# Patient Record
Sex: MALE | Race: WHITE | NOT HISPANIC OR LATINO
[De-identification: names, ages, dates, MRNs, and addresses within clinical notes are randomized per-mention and may not be internally consistent; named-entity substitution may affect disease eponyms.]

---

## 2017-03-28 ENCOUNTER — APPOINTMENT (OUTPATIENT)
Dept: CARDIOLOGY | Facility: CLINIC | Age: 82
End: 2017-03-28

## 2017-03-28 VITALS
OXYGEN SATURATION: 99 % | DIASTOLIC BLOOD PRESSURE: 66 MMHG | BODY MASS INDEX: 22.73 KG/M2 | WEIGHT: 150 LBS | SYSTOLIC BLOOD PRESSURE: 141 MMHG | HEART RATE: 58 BPM | HEIGHT: 68 IN

## 2017-03-28 VITALS
OXYGEN SATURATION: 99 % | BODY MASS INDEX: 22.73 KG/M2 | DIASTOLIC BLOOD PRESSURE: 66 MMHG | SYSTOLIC BLOOD PRESSURE: 141 MMHG | HEART RATE: 60 BPM | HEIGHT: 68 IN | RESPIRATION RATE: 12 BRPM | WEIGHT: 150 LBS

## 2017-03-28 DIAGNOSIS — Z95.0 PRESENCE OF CARDIAC PACEMAKER: ICD-10-CM

## 2017-03-28 DIAGNOSIS — Z95.4 PRESENCE OF OTHER HEART-VALVE REPLACEMENT: ICD-10-CM

## 2017-03-28 DIAGNOSIS — I48.91 UNSPECIFIED ATRIAL FIBRILLATION: ICD-10-CM

## 2017-03-28 DIAGNOSIS — I10 ESSENTIAL (PRIMARY) HYPERTENSION: ICD-10-CM

## 2017-03-28 DIAGNOSIS — Z85.528 PERSONAL HISTORY OF OTHER MALIGNANT NEOPLASM OF KIDNEY: ICD-10-CM

## 2017-09-26 ENCOUNTER — APPOINTMENT (OUTPATIENT)
Dept: CARDIOLOGY | Facility: CLINIC | Age: 82
End: 2017-09-26

## 2018-12-26 ENCOUNTER — APPOINTMENT (OUTPATIENT)
Dept: CARDIOLOGY | Facility: CLINIC | Age: 83
End: 2018-12-26
Payer: MEDICARE

## 2018-12-26 PROCEDURE — 93280 PM DEVICE PROGR EVAL DUAL: CPT

## 2019-08-14 ENCOUNTER — APPOINTMENT (OUTPATIENT)
Dept: CARDIOLOGY | Facility: CLINIC | Age: 84
End: 2019-08-14
Payer: MEDICARE

## 2019-08-14 ENCOUNTER — APPOINTMENT (OUTPATIENT)
Dept: CARDIOLOGY | Facility: CLINIC | Age: 84
End: 2019-08-14

## 2019-08-14 PROCEDURE — 93280 PM DEVICE PROGR EVAL DUAL: CPT

## 2020-11-16 ENCOUNTER — APPOINTMENT (OUTPATIENT)
Dept: CARDIOLOGY | Facility: CLINIC | Age: 85
End: 2020-11-16

## 2021-06-15 ENCOUNTER — INPATIENT (INPATIENT)
Facility: HOSPITAL | Age: 86
LOS: 7 days | Discharge: NURSING, REHAB OR LONG-TERM CARE | DRG: 522 | End: 2021-06-23
Attending: FAMILY MEDICINE | Admitting: FAMILY MEDICINE
Payer: MEDICARE

## 2021-06-15 VITALS
TEMPERATURE: 98 F | OXYGEN SATURATION: 95 % | HEIGHT: 66 IN | HEART RATE: 92 BPM | SYSTOLIC BLOOD PRESSURE: 122 MMHG | DIASTOLIC BLOOD PRESSURE: 74 MMHG | RESPIRATION RATE: 18 BRPM | WEIGHT: 169.98 LBS

## 2021-06-15 DIAGNOSIS — S72.001A FRACTURE OF UNSPECIFIED PART OF NECK OF RIGHT FEMUR, INITIAL ENCOUNTER FOR CLOSED FRACTURE: ICD-10-CM

## 2021-06-15 LAB
ALBUMIN SERPL ELPH-MCNC: 3.4 G/DL — SIGNIFICANT CHANGE UP (ref 3.3–5)
ALP SERPL-CCNC: 146 U/L — HIGH (ref 40–120)
ALT FLD-CCNC: 13 U/L — SIGNIFICANT CHANGE UP (ref 12–78)
ANION GAP SERPL CALC-SCNC: 10 MMOL/L — SIGNIFICANT CHANGE UP (ref 5–17)
APTT BLD: 32.3 SEC — SIGNIFICANT CHANGE UP (ref 27.5–35.5)
AST SERPL-CCNC: 21 U/L — SIGNIFICANT CHANGE UP (ref 15–37)
BASOPHILS # BLD AUTO: 0.01 K/UL — SIGNIFICANT CHANGE UP (ref 0–0.2)
BASOPHILS NFR BLD AUTO: 0.1 % — SIGNIFICANT CHANGE UP (ref 0–2)
BILIRUB SERPL-MCNC: 0.5 MG/DL — SIGNIFICANT CHANGE UP (ref 0.2–1.2)
BUN SERPL-MCNC: 35 MG/DL — HIGH (ref 7–23)
CALCIUM SERPL-MCNC: 9 MG/DL — SIGNIFICANT CHANGE UP (ref 8.5–10.1)
CHLORIDE SERPL-SCNC: 104 MMOL/L — SIGNIFICANT CHANGE UP (ref 96–108)
CO2 SERPL-SCNC: 25 MMOL/L — SIGNIFICANT CHANGE UP (ref 22–31)
CREAT SERPL-MCNC: 2 MG/DL — HIGH (ref 0.5–1.3)
EOSINOPHIL # BLD AUTO: 0.02 K/UL — SIGNIFICANT CHANGE UP (ref 0–0.5)
EOSINOPHIL NFR BLD AUTO: 0.2 % — SIGNIFICANT CHANGE UP (ref 0–6)
GLUCOSE SERPL-MCNC: 121 MG/DL — HIGH (ref 70–99)
HCT VFR BLD CALC: 35.9 % — LOW (ref 39–50)
HGB BLD-MCNC: 11.9 G/DL — LOW (ref 13–17)
IMM GRANULOCYTES NFR BLD AUTO: 0.2 % — SIGNIFICANT CHANGE UP (ref 0–1.5)
INR BLD: 1.11 RATIO — SIGNIFICANT CHANGE UP (ref 0.88–1.16)
LYMPHOCYTES # BLD AUTO: 0.96 K/UL — LOW (ref 1–3.3)
LYMPHOCYTES # BLD AUTO: 11.5 % — LOW (ref 13–44)
MCHC RBC-ENTMCNC: 30.1 PG — SIGNIFICANT CHANGE UP (ref 27–34)
MCHC RBC-ENTMCNC: 33.1 GM/DL — SIGNIFICANT CHANGE UP (ref 32–36)
MCV RBC AUTO: 90.9 FL — SIGNIFICANT CHANGE UP (ref 80–100)
MONOCYTES # BLD AUTO: 0.87 K/UL — SIGNIFICANT CHANGE UP (ref 0–0.9)
MONOCYTES NFR BLD AUTO: 10.5 % — SIGNIFICANT CHANGE UP (ref 2–14)
NEUTROPHILS # BLD AUTO: 6.44 K/UL — SIGNIFICANT CHANGE UP (ref 1.8–7.4)
NEUTROPHILS NFR BLD AUTO: 77.5 % — HIGH (ref 43–77)
NRBC # BLD: 0 /100 WBCS — SIGNIFICANT CHANGE UP (ref 0–0)
PLATELET # BLD AUTO: 171 K/UL — SIGNIFICANT CHANGE UP (ref 150–400)
POTASSIUM SERPL-MCNC: 3.9 MMOL/L — SIGNIFICANT CHANGE UP (ref 3.5–5.3)
POTASSIUM SERPL-SCNC: 3.9 MMOL/L — SIGNIFICANT CHANGE UP (ref 3.5–5.3)
PROT SERPL-MCNC: 7.3 G/DL — SIGNIFICANT CHANGE UP (ref 6–8.3)
PROTHROM AB SERPL-ACNC: 12.9 SEC — SIGNIFICANT CHANGE UP (ref 10.6–13.6)
RBC # BLD: 3.95 M/UL — LOW (ref 4.2–5.8)
RBC # FLD: 13.5 % — SIGNIFICANT CHANGE UP (ref 10.3–14.5)
SODIUM SERPL-SCNC: 139 MMOL/L — SIGNIFICANT CHANGE UP (ref 135–145)
TSH SERPL-MCNC: 0.8 UIU/ML — SIGNIFICANT CHANGE UP (ref 0.36–3.74)
WBC # BLD: 8.32 K/UL — SIGNIFICANT CHANGE UP (ref 3.8–10.5)
WBC # FLD AUTO: 8.32 K/UL — SIGNIFICANT CHANGE UP (ref 3.8–10.5)

## 2021-06-15 PROCEDURE — 73552 X-RAY EXAM OF FEMUR 2/>: CPT | Mod: 26,RT

## 2021-06-15 PROCEDURE — 72170 X-RAY EXAM OF PELVIS: CPT | Mod: 26,59

## 2021-06-15 PROCEDURE — G1004: CPT

## 2021-06-15 PROCEDURE — 76376 3D RENDER W/INTRP POSTPROCES: CPT | Mod: 26

## 2021-06-15 PROCEDURE — 99285 EMERGENCY DEPT VISIT HI MDM: CPT

## 2021-06-15 PROCEDURE — 71045 X-RAY EXAM CHEST 1 VIEW: CPT | Mod: 26

## 2021-06-15 PROCEDURE — 73502 X-RAY EXAM HIP UNI 2-3 VIEWS: CPT | Mod: 26,RT

## 2021-06-15 PROCEDURE — 70450 CT HEAD/BRAIN W/O DYE: CPT | Mod: 26,ME

## 2021-06-15 PROCEDURE — 72192 CT PELVIS W/O DYE: CPT | Mod: 26

## 2021-06-15 RX ORDER — FINASTERIDE 5 MG/1
5 TABLET, FILM COATED ORAL DAILY
Refills: 0 | Status: DISCONTINUED | OUTPATIENT
Start: 2021-06-15 | End: 2021-06-17

## 2021-06-15 RX ORDER — ACETAMINOPHEN 500 MG
650 TABLET ORAL EVERY 6 HOURS
Refills: 0 | Status: DISCONTINUED | OUTPATIENT
Start: 2021-06-15 | End: 2021-06-17

## 2021-06-15 RX ORDER — ASCORBIC ACID 60 MG
500 TABLET,CHEWABLE ORAL DAILY
Refills: 0 | Status: DISCONTINUED | OUTPATIENT
Start: 2021-06-15 | End: 2021-06-17

## 2021-06-15 RX ORDER — MECLIZINE HCL 12.5 MG
12.5 TABLET ORAL THREE TIMES A DAY
Refills: 0 | Status: DISCONTINUED | OUTPATIENT
Start: 2021-06-15 | End: 2021-06-17

## 2021-06-15 RX ORDER — FOLIC ACID 0.8 MG
1 TABLET ORAL DAILY
Refills: 0 | Status: DISCONTINUED | OUTPATIENT
Start: 2021-06-15 | End: 2021-06-17

## 2021-06-15 RX ORDER — ATENOLOL 25 MG/1
25 TABLET ORAL DAILY
Refills: 0 | Status: DISCONTINUED | OUTPATIENT
Start: 2021-06-15 | End: 2021-06-16

## 2021-06-15 RX ORDER — SIMVASTATIN 20 MG/1
10 TABLET, FILM COATED ORAL AT BEDTIME
Refills: 0 | Status: DISCONTINUED | OUTPATIENT
Start: 2021-06-15 | End: 2021-06-17

## 2021-06-15 RX ORDER — SODIUM CHLORIDE 9 MG/ML
1000 INJECTION INTRAMUSCULAR; INTRAVENOUS; SUBCUTANEOUS ONCE
Refills: 0 | Status: COMPLETED | OUTPATIENT
Start: 2021-06-15 | End: 2021-06-15

## 2021-06-15 RX ORDER — SODIUM CHLORIDE 9 MG/ML
1000 INJECTION, SOLUTION INTRAVENOUS
Refills: 0 | Status: DISCONTINUED | OUTPATIENT
Start: 2021-06-15 | End: 2021-06-17

## 2021-06-15 RX ORDER — HEPARIN SODIUM 5000 [USP'U]/ML
5000 INJECTION INTRAVENOUS; SUBCUTANEOUS EVERY 12 HOURS
Refills: 0 | Status: DISCONTINUED | OUTPATIENT
Start: 2021-06-15 | End: 2021-06-15

## 2021-06-15 RX ADMIN — SODIUM CHLORIDE 1000 MILLILITER(S): 9 INJECTION INTRAMUSCULAR; INTRAVENOUS; SUBCUTANEOUS at 19:09

## 2021-06-15 RX ADMIN — FINASTERIDE 5 MILLIGRAM(S): 5 TABLET, FILM COATED ORAL at 22:43

## 2021-06-15 RX ADMIN — SIMVASTATIN 10 MILLIGRAM(S): 20 TABLET, FILM COATED ORAL at 22:43

## 2021-06-15 RX ADMIN — ATENOLOL 25 MILLIGRAM(S): 25 TABLET ORAL at 22:43

## 2021-06-15 RX ADMIN — Medication 500 MILLIGRAM(S): at 22:43

## 2021-06-15 RX ADMIN — Medication 1 MILLIGRAM(S): at 22:43

## 2021-06-15 NOTE — H&P ADULT - NSICDXPASTMEDICALHX_GEN_ALL_CORE_FT
PAST MEDICAL HISTORY:  Aortic prosthetic valve regurgitation     Aortic valve disorder     Artificial pacemaker     Benign essential hypertension     Calculus of ureter 2/2015    h/o  ? Atrial fibrillation     h/o Aortic aneurysm     h/o Disc prolapse Lumbar     h/o Symptomatic Bradycardia     h/o TIA (transient ischemic attack) 6 yrs ago     H/O unilateral nephrectomy     HLD (hyperlipidemia)     HTN (hypertension)     Hyperlipidemia

## 2021-06-15 NOTE — H&P ADULT - ASSESSMENT
FAM GRANADOS is a 89y Male presented to the ED from home with son for generalized weakness and worsening confusion and a fall on Monday morning around 3 am. At baseline patient ambulates with walker.

## 2021-06-15 NOTE — ED PROVIDER NOTE - PMH
Aortic prosthetic valve regurgitation    Aortic valve disorder    Artificial pacemaker    Benign essential hypertension    Calculus of ureter  2/2015  h/o  ? Atrial fibrillation    h/o Aortic aneurysm    h/o Disc prolapse Lumbar    h/o Symptomatic Bradycardia    h/o TIA (transient ischemic attack) 6 yrs ago    H/O unilateral nephrectomy    HLD (hyperlipidemia)    HTN (hypertension)    Hyperlipidemia

## 2021-06-15 NOTE — ED PROVIDER NOTE - ATTENDING CONTRIBUTION TO CARE
Pt is a 89 yo male who presents to the ED with a cc of worsening confusion and weakness since Monday. PMHx of Aortic prosthetic valve regurgitation, Artificial pacemaker, HTN, h/o renal stone, ? h/o A-fib not on AC, h/o aortic aneurysm, h/o lumbar disc herniation, h/o bradycardia, h/o TIA, h/o nephrectomy, HLD, HTN.  Pt reports that on Monday around 3 am he suffered a fall. Per son he reports that they have cameras set up in the house and they were able to witness him tripping and falling early Monday. At baseline pt usually ambulates with a walker. Since then pt appears to be more confused then normal and has not been able to able. Son reports that they attempted to stand him and he collapsed to the ground. It appeared that his legs just gave out on him. Son reports no episodes of vomiting or diarrhea. Pt cannot provide history due to advanced dementia. On exam pt lying in bed awake and appears to be alert to name only at this time, NCAT, PERRL, heart RR with murmur, pacemaker noted, lungs CTA, abd soft NT/ND. No midline C/T/L noted on exam. Diffuse TTP to right hip noted on exam. Pt is moving RLE. Difficult to obtain good leg length exam as pt is lying on left side and has right leg flexed at knee. Attempts to straighten are actively fought by pt. +pedal pulse bilaterally. Pt presenting with fall and now with worsening confusion and inability to ambulate. Concern for infection vs possible fracture. Will obtain screening labs x-ray of right hip, CT head EKG and will monitor

## 2021-06-15 NOTE — ED PROVIDER NOTE - PSH
Aortic valve replacement Oct 2011    h/o Lumbar laminectomy    Pacemaker  Oct 2011  Patent foramen ovale repair    s/p Aortic aneurysm repair  10/25/2011

## 2021-06-15 NOTE — ED ADULT NURSE NOTE - NSIMPLEMENTINTERV_GEN_ALL_ED
Implemented All Fall with Harm Risk Interventions:  [place] to call system. Call bell, personal items and telephone within reach. Instruct patient to call for assistance. Room bathroom lighting operational. Non-slip footwear when patient is off stretcher. Physically safe environment: no spills, clutter or unnecessary equipment. Stretcher in lowest position, wheels locked, appropriate side rails in place. Provide visual cue, wrist band, yellow gown, etc. Monitor gait and stability. Monitor for mental status changes and reorient to person, place, and time. Review medications for side effects contributing to fall risk. Reinforce activity limits and safety measures with patient and family. Provide visual clues: red socks.

## 2021-06-15 NOTE — H&P ADULT - HISTORY OF PRESENT ILLNESS
Chart and labs reviewed.   Chart and labs reviewed.  FAM GRANADOS is a 89y Male presented to the ED from home with son for generalized weakness and worsening confusion and a fall on Monday morning around 3 am. At baseline patient ambulates with walker. Son explains they have cameras in the house and saw him trip and fall Monday morning. Since then patient has been more confused than baseline and unable ambulate. Patient unable to provide history due to dementia. As per son, no sob, vomiting or diarrhea. no blood thinners. Patient currently takes no medications daily.

## 2021-06-15 NOTE — ED ADULT NURSE NOTE - OBJECTIVE STATEMENT
Pt presents to the ED via ambulance s/p increase confusion after a fall at Monday 330 am. Pt has a history of dementia as per son. Pt is moving all of his extremities independently.

## 2021-06-15 NOTE — ED PROVIDER NOTE - SKIN, MLM
Skin normal color for race, warm, dry and intact. No evidence of rash. no signs of trauma. no abrasions, lacerations or ecchymosis

## 2021-06-15 NOTE — ED PROVIDER NOTE - OBJECTIVE STATEMENT
89 yo male with h/o pacemaker and dementia presents to the ED from home with son for generalized weakness and worsening confusion s/p fall Monday morning around 3 am. At baseline patient ambulates with walker. Son explains they have cameras in the house and saw him trip and fall Monday morning. Since then patient has been more confused than baseline and unable ambulate. Patient unable to provide history due to dementia. As per son, no sob, vomiting or diarrhea. no blood thinners. Patient currently takes no medications daily.     pmd: Dr. Frank Amico

## 2021-06-15 NOTE — H&P ADULT - NSHPLABSRESULTS_GEN_ALL_CORE
11.9   8.32  )-----------( 171      ( 15 Nigel 2021 19:53 )             35.9     15 Nigel 2021 19:53    139    |  104    |  35     ----------------------------<  121    3.9     |  25     |  2.00     Ca    9.0        15 Nigel 2021 19:53    TPro  7.3    /  Alb  3.4    /  TBili  0.5    /  DBili  x      /  AST  21     /  ALT  13     /  AlkPhos  146    15 Nigel 2021 19:53    LIVER FUNCTIONS - ( 15 Nigel 2021 19:53 )  Alb: 3.4 g/dL / Pro: 7.3 g/dL / ALK PHOS: 146 U/L / ALT: 13 U/L / AST: 21 U/L / GGT: x             CAPILLARY BLOOD GLUCOSE    < from: CT Head No Cont (06.15.21 @ 19:05) >    IMPRESSION: No evidence acute hemorrhage mass or mass effect.    < end of copied text > 11.9   8.32  )-----------( 171      ( 15 Nigel 2021 19:53 )             35.9     15 Nigel 2021 19:53    139    |  104    |  35     ----------------------------<  121    3.9     |  25     |  2.00     Ca    9.0        15 Nigel 2021 19:53    TPro  7.3    /  Alb  3.4    /  TBili  0.5    /  DBili  x      /  AST  21     /  ALT  13     /  AlkPhos  146    15 Nigel 2021 19:53    LIVER FUNCTIONS - ( 15 Nigel 2021 19:53 )  Alb: 3.4 g/dL / Pro: 7.3 g/dL / ALK PHOS: 146 U/L / ALT: 13 U/L / AST: 21 U/L / GGT: x             CAPILLARY BLOOD GLUCOSE    < from: CT Head No Cont (06.15.21 @ 19:05) >    IMPRESSION: No evidence acute hemorrhage mass or mass effect.    < end of copied text >    x< from: Xray Femur 2 Views, Right (06.15.21 @ 21:21) >    MPRESSION: Subcapital fracture right hip described in a separate report. Remainder the rightfemur is intact. Diffuse demineralization. Degenerative change at the knee. Vascular calcification.    < end of copied text >    < from: Xray Hip 2-3 Views, Left (06.16.21 @ 10:30) >        < end of copied text >

## 2021-06-15 NOTE — H&P ADULT - RS GEN PE MLT RESP DETAILS PC
airway patent/breath sounds equal/respirations non-labored/clear to auscultation bilaterally/diminished breath sounds, L/diminished breath sounds, R

## 2021-06-15 NOTE — ED ADULT TRIAGE NOTE - CHIEF COMPLAINT QUOTE
patient cam ein ED from home BIBA with c/o fall incident last Monday 03:00am, and worsening confusion since then.

## 2021-06-15 NOTE — ED PROVIDER NOTE - MUSCULOSKELETAL, MLM
Spine appears normal, range of motion is not limited, no muscle or joint tenderness. no chest wall tenderness. no midline vertebral tenderness. no pelvic tenderness, FROM of hips.

## 2021-06-15 NOTE — H&P ADULT - MUSCULOSKELETAL
ROM intact/no joint swelling/no joint erythema/no joint warmth/no calf tenderness details… detailed exam no joint swelling/no joint erythema/no joint warmth/no calf tenderness

## 2021-06-15 NOTE — ED PROVIDER NOTE - DATE/TIME FOR CONVERSATION WITH ATTENDING MD
Medical Necessity Information: It is in your best interest to select a reason for this procedure from the list below. All of these items fulfill various CMS LCD requirements except the new and changing color options. Consent: The patient's consent was obtained including but not limited to risks of crusting, scabbing, blistering, scarring, darker or lighter pigmentary change, recurrence, incomplete removal and infection. Render Post-Care Instructions In Note?: no Number Of Freeze-Thaw Cycles: 3 freeze-thaw cycles Detail Level: Detailed Post-Care Instructions: I reviewed with the patient in detail post-care instructions. Patient is to wear sunprotection, and avoid picking at any of the treated lesions. Pt may apply Vaseline to crusted or scabbing areas. Medical Necessity Clause: This procedure was medically necessary because the lesions that were treated were: 15-Nigel-2021 20:46

## 2021-06-15 NOTE — CONSULT NOTE ADULT - SUBJECTIVE AND OBJECTIVE BOX
89y Male presents with right hip pain. Patient has a hx of dementia and lives at home with a health aide. He was noted to have a fall on monday morning, and was caught on camera. Due to patient dementia patient unable to provide histoyr, Son was at bedside. Unknown headstrike, or LOC. Patient denies any numbness/tingling in the affected extremity. The patient normally ambulates with a walker at baseline. They deny any other orthopedic injuries at this time.       PAST MEDICAL & SURGICAL HISTORY:  HTN (hypertension)    HLD (hyperlipidemia)    h/o Aortic aneurysm    h/o TIA (transient ischemic attack) 6 yrs ago    h/o Disc prolapse Lumbar    h/o Symptomatic Bradycardia    h/o  ? Atrial fibrillation    Hyperlipidemia    Benign essential hypertension    Aortic valve disorder    H/O unilateral nephrectomy    Aortic prosthetic valve regurgitation    Artificial pacemaker    Calculus of ureter  2/2015    h/o Lumbar laminectomy    Pacemaker  Oct 2011    s/p Aortic aneurysm repair  10/25/2011    Aortic valve replacement Oct 2011    Patent foramen ovale repair      Home Medications:  acetaminophen 325 mg oral tablet: 2 tab(s) orally every 6 hours, As needed, For Temp over 38.3 C (100.94 F) (17 Feb 2015 09:59)  alfuzosin 10 mg oral tablet, extended release: 1 tab(s) orally once a day (17 Feb 2015 09:59)  atenolol 25 mg oral tablet: 1 tab(s) orally once a day (17 Feb 2015 09:59)  Duricef: 500mg 1 tab PO q24h (17 Feb 2015 14:33)  Ecotrin 325 mg oral delayed release tablet: 1 tab(s) orally once a day (17 Feb 2015 09:59)  finasteride 5 mg oral tablet: 1 tab(s) orally once a day (17 Feb 2015 09:59)  folic acid 1 mg oral tablet: 1 tab(s) orally once a day (17 Feb 2015 09:59)  meclizine 12.5 mg oral tablet: 1 tab(s) orally 3 times a day (17 Feb 2015 09:59)  simvastatin 10 mg oral tablet: 1 tab(s) orally once a day (at bedtime) (17 Feb 2015 09:59)  tamsulosin 0.4 mg oral capsule: 1 cap(s) orally once a day (17 Feb 2015 09:59)  Tylenol with Codeine #2: 1 tab PO q6h PRN pain  (17 Feb 2015 14:33)  Vitamin C 500 mg oral tablet: 1 tab(s) orally once a day (17 Feb 2015 09:59)    Allergies    shellfish (Rash)  Sulfa druga - Rash (Rash)  sulfa drugs (Hives)    Intolerances                              11.9   8.32  )-----------( 171      ( 15 Nigel 2021 19:53 )             35.9     06-15    139  |  104  |  35<H>  ----------------------------<  121<H>  3.9   |  25  |  2.00<H>    Ca    9.0      15 Nigel 2021 19:53    TPro  7.3  /  Alb  3.4  /  TBili  0.5  /  DBili  x   /  AST  21  /  ALT  13  /  AlkPhos  146<H>  06-15          Vital Signs Last 24 Hrs  T(C): 36.8 (15 Nigel 2021 18:05), Max: 36.8 (15 Nigel 2021 18:05)  T(F): 98.2 (15 Nigel 2021 18:05), Max: 98.2 (15 Nigel 2021 18:05)  HR: 92 (15 Nigel 2021 17:14) (92 - 92)  BP: 122/74 (15 Nigel 2021 17:14) (122/74 - 122/74)  BP(mean): --  RR: 18 (15 Nigel 2021 17:14) (18 - 18)  SpO2: 95% (15 Nigel 2021 17:14) (95% - 95%)    PHYSICAL EXAM  GEN: NAD, Awake and Alert, AOx2  RLE:   Skin: intact without abrasions / ecchymosis  TTP over hip  Unable to SLR  Pain with log roll  Compartments soft and compressible  + EHL/FHL/TA/GSC  SILT L3-S1  DP +    Secondary Exam:  SPINE: Skin intact, no bony tenderness or step-offs appreciated throughout cervical/thoracic/lumbar/sacral spine    BL/UE: Skin intact, no erythema, ecchymosis, edema, gross deformity, NTTP over the bony prominences of the shoulder/elbow/wrist/hand, painless passive/active ROM of the shoulder/elbow/wrist/hand, C5-T1 SILT, motor grossly intact throughout axillary/musculocutaenous/radial/median/ulnar nerves, + radial pulse    LLE: Skin intact, no erythema, ecchymosis, edema, gross deformity, NTTP over the bony prominences of the hip/knee/ankle/foot, painless passive/active ROM of the hip/knee/ankle/foot, L2-S1 SILT, motor grossly intact throughout hip flexors/quads/hams/TA/EHL/FHL/GSC, + DP/PT pulses, no pain with log roll, no pain on axial loading, compartments soft and compressible, calves nontender     IMAGING:  XR Right hip: acute displaced femoral neck fracture      Assessment/Plan:  89y Male with right femoral neck fracture    - plan for OR for operative fixation, will need a hemiarthroplasty   - NPO except medications after midnight  - IVF while NPO  - NWB RLE, bedrest  - Please hold chemical DVT ppx, SCDs okay  - FU Preop labs  - FU XR of left hip  - Medical comanagement appreciate by primary medical team  - Patient has a pacemaker, Trippy Bandz, will needed it interrogated, please document clearance   - Patient will likely need cardiology clearance, please document clearance  - Will discuss with attending and advise further if plan changes

## 2021-06-15 NOTE — ED PROVIDER NOTE - CLINICAL SUMMARY MEDICAL DECISION MAKING FREE TEXT BOX
89 yo male with h/o pacemaker and dementia presents to the ED from home with son for generalized weakness and worsening confusion s/p fall Monday morning around 3 am. At baseline patient ambulates with walker. Son explains they have cameras in the house and saw him trip and fall Monday morning. Since then patient has been more confused than baseline and unable ambulate. Patient unable to provide history due to dementia. As per son, no sob, vomiting or diarrhea. no blood thinners. Patient currently takes no medications daily. PE: as above. A/P: ekg, labs, ua/cx, CT head

## 2021-06-15 NOTE — H&P ADULT - NSICDXPASTSURGICALHX_GEN_ALL_CORE_FT
PAST SURGICAL HISTORY:  Aortic valve replacement Oct 2011     h/o Lumbar laminectomy     Pacemaker Oct 2011    Patent foramen ovale repair     s/p Aortic aneurysm repair  10/25/2011

## 2021-06-16 ENCOUNTER — TRANSCRIPTION ENCOUNTER (OUTPATIENT)
Age: 86
End: 2021-06-16

## 2021-06-16 DIAGNOSIS — R41.0 DISORIENTATION, UNSPECIFIED: ICD-10-CM

## 2021-06-16 DIAGNOSIS — S72.001A FRACTURE OF UNSPECIFIED PART OF NECK OF RIGHT FEMUR, INITIAL ENCOUNTER FOR CLOSED FRACTURE: ICD-10-CM

## 2021-06-16 DIAGNOSIS — R41.82 ALTERED MENTAL STATUS, UNSPECIFIED: ICD-10-CM

## 2021-06-16 LAB
A1C WITH ESTIMATED AVERAGE GLUCOSE RESULT: 5.6 % — SIGNIFICANT CHANGE UP (ref 4–5.6)
ANION GAP SERPL CALC-SCNC: 8 MMOL/L — SIGNIFICANT CHANGE UP (ref 5–17)
APPEARANCE UR: CLEAR — SIGNIFICANT CHANGE UP
APTT BLD: 28.9 SEC — SIGNIFICANT CHANGE UP (ref 27.5–35.5)
BILIRUB UR-MCNC: NEGATIVE — SIGNIFICANT CHANGE UP
BUN SERPL-MCNC: 33 MG/DL — HIGH (ref 7–23)
CALCIUM SERPL-MCNC: 8.6 MG/DL — SIGNIFICANT CHANGE UP (ref 8.5–10.1)
CHLORIDE SERPL-SCNC: 110 MMOL/L — HIGH (ref 96–108)
CO2 SERPL-SCNC: 23 MMOL/L — SIGNIFICANT CHANGE UP (ref 22–31)
COLOR SPEC: YELLOW — SIGNIFICANT CHANGE UP
COVID-19 SPIKE DOMAIN AB INTERP: NEGATIVE — SIGNIFICANT CHANGE UP
COVID-19 SPIKE DOMAIN ANTIBODY RESULT: 0.4 U/ML — SIGNIFICANT CHANGE UP
CREAT SERPL-MCNC: 1.5 MG/DL — HIGH (ref 0.5–1.3)
DIFF PNL FLD: ABNORMAL
ESTIMATED AVERAGE GLUCOSE: 114 MG/DL — SIGNIFICANT CHANGE UP (ref 68–114)
GLUCOSE SERPL-MCNC: 105 MG/DL — HIGH (ref 70–99)
GLUCOSE UR QL: NEGATIVE — SIGNIFICANT CHANGE UP
HCT VFR BLD CALC: 32.7 % — LOW (ref 39–50)
HGB BLD-MCNC: 11.1 G/DL — LOW (ref 13–17)
INR BLD: 1.16 RATIO — SIGNIFICANT CHANGE UP (ref 0.88–1.16)
KETONES UR-MCNC: NEGATIVE — SIGNIFICANT CHANGE UP
LEUKOCYTE ESTERASE UR-ACNC: ABNORMAL
MCHC RBC-ENTMCNC: 30.4 PG — SIGNIFICANT CHANGE UP (ref 27–34)
MCHC RBC-ENTMCNC: 33.9 GM/DL — SIGNIFICANT CHANGE UP (ref 32–36)
MCV RBC AUTO: 89.6 FL — SIGNIFICANT CHANGE UP (ref 80–100)
NITRITE UR-MCNC: NEGATIVE — SIGNIFICANT CHANGE UP
NRBC # BLD: 0 /100 WBCS — SIGNIFICANT CHANGE UP (ref 0–0)
PH UR: 6.5 — SIGNIFICANT CHANGE UP (ref 5–8)
PLATELET # BLD AUTO: 167 K/UL — SIGNIFICANT CHANGE UP (ref 150–400)
POTASSIUM SERPL-MCNC: 4.1 MMOL/L — SIGNIFICANT CHANGE UP (ref 3.5–5.3)
POTASSIUM SERPL-SCNC: 4.1 MMOL/L — SIGNIFICANT CHANGE UP (ref 3.5–5.3)
PROT UR-MCNC: 30 MG/DL
PROTHROM AB SERPL-ACNC: 13.5 SEC — SIGNIFICANT CHANGE UP (ref 10.6–13.6)
RBC # BLD: 3.65 M/UL — LOW (ref 4.2–5.8)
RBC # FLD: 13.5 % — SIGNIFICANT CHANGE UP (ref 10.3–14.5)
SARS-COV-2 IGG+IGM SERPL QL IA: 0.4 U/ML — SIGNIFICANT CHANGE UP
SARS-COV-2 IGG+IGM SERPL QL IA: NEGATIVE — SIGNIFICANT CHANGE UP
SARS-COV-2 RNA SPEC QL NAA+PROBE: SIGNIFICANT CHANGE UP
SODIUM SERPL-SCNC: 141 MMOL/L — SIGNIFICANT CHANGE UP (ref 135–145)
SP GR SPEC: 1.01 — SIGNIFICANT CHANGE UP (ref 1.01–1.02)
UROBILINOGEN FLD QL: NEGATIVE — SIGNIFICANT CHANGE UP
WBC # BLD: 9.18 K/UL — SIGNIFICANT CHANGE UP (ref 3.8–10.5)
WBC # FLD AUTO: 9.18 K/UL — SIGNIFICANT CHANGE UP (ref 3.8–10.5)

## 2021-06-16 PROCEDURE — 73502 X-RAY EXAM HIP UNI 2-3 VIEWS: CPT | Mod: 26,LT

## 2021-06-16 PROCEDURE — 99222 1ST HOSP IP/OBS MODERATE 55: CPT | Mod: 25

## 2021-06-16 PROCEDURE — 93280 PM DEVICE PROGR EVAL DUAL: CPT | Mod: 26

## 2021-06-16 RX ORDER — TRAMADOL HYDROCHLORIDE 50 MG/1
50 TABLET ORAL EVERY 6 HOURS
Refills: 0 | Status: DISCONTINUED | OUTPATIENT
Start: 2021-06-17 | End: 2021-06-23

## 2021-06-16 RX ORDER — ASCORBIC ACID 60 MG
500 TABLET,CHEWABLE ORAL DAILY
Refills: 0 | Status: DISCONTINUED | OUTPATIENT
Start: 2021-06-17 | End: 2021-06-23

## 2021-06-16 RX ORDER — POLYETHYLENE GLYCOL 3350 17 G/17G
17 POWDER, FOR SOLUTION ORAL AT BEDTIME
Refills: 0 | Status: DISCONTINUED | OUTPATIENT
Start: 2021-06-17 | End: 2021-06-23

## 2021-06-16 RX ORDER — MECLIZINE HCL 12.5 MG
12.5 TABLET ORAL THREE TIMES A DAY
Refills: 0 | Status: DISCONTINUED | OUTPATIENT
Start: 2021-06-17 | End: 2021-06-23

## 2021-06-16 RX ORDER — SENNA PLUS 8.6 MG/1
2 TABLET ORAL AT BEDTIME
Refills: 0 | Status: DISCONTINUED | OUTPATIENT
Start: 2021-06-17 | End: 2021-06-23

## 2021-06-16 RX ORDER — MORPHINE SULFATE 50 MG/1
2 CAPSULE, EXTENDED RELEASE ORAL EVERY 4 HOURS
Refills: 0 | Status: DISCONTINUED | OUTPATIENT
Start: 2021-06-17 | End: 2021-06-17

## 2021-06-16 RX ORDER — FOLIC ACID 0.8 MG
1 TABLET ORAL DAILY
Refills: 0 | Status: DISCONTINUED | OUTPATIENT
Start: 2021-06-17 | End: 2021-06-23

## 2021-06-16 RX ORDER — MAGNESIUM HYDROXIDE 400 MG/1
30 TABLET, CHEWABLE ORAL DAILY
Refills: 0 | Status: DISCONTINUED | OUTPATIENT
Start: 2021-06-17 | End: 2021-06-23

## 2021-06-16 RX ORDER — ACETAMINOPHEN 500 MG
650 TABLET ORAL EVERY 6 HOURS
Refills: 0 | Status: DISCONTINUED | OUTPATIENT
Start: 2021-06-17 | End: 2021-06-23

## 2021-06-16 RX ORDER — FINASTERIDE 5 MG/1
5 TABLET, FILM COATED ORAL DAILY
Refills: 0 | Status: DISCONTINUED | OUTPATIENT
Start: 2021-06-17 | End: 2021-06-23

## 2021-06-16 RX ORDER — ONDANSETRON 8 MG/1
4 TABLET, FILM COATED ORAL EVERY 6 HOURS
Refills: 0 | Status: DISCONTINUED | OUTPATIENT
Start: 2021-06-17 | End: 2021-06-23

## 2021-06-16 RX ORDER — HEPARIN SODIUM 5000 [USP'U]/ML
5000 INJECTION INTRAVENOUS; SUBCUTANEOUS ONCE
Refills: 0 | Status: COMPLETED | OUTPATIENT
Start: 2021-06-16 | End: 2021-06-16

## 2021-06-16 RX ORDER — ACETAMINOPHEN 500 MG
1000 TABLET ORAL ONCE
Refills: 0 | Status: COMPLETED | OUTPATIENT
Start: 2021-06-16 | End: 2021-06-16

## 2021-06-16 RX ORDER — MORPHINE SULFATE 50 MG/1
2 CAPSULE, EXTENDED RELEASE ORAL EVERY 4 HOURS
Refills: 0 | Status: DISCONTINUED | OUTPATIENT
Start: 2021-06-16 | End: 2021-06-17

## 2021-06-16 RX ORDER — SIMVASTATIN 20 MG/1
10 TABLET, FILM COATED ORAL AT BEDTIME
Refills: 0 | Status: DISCONTINUED | OUTPATIENT
Start: 2021-06-17 | End: 2021-06-23

## 2021-06-16 RX ORDER — PANTOPRAZOLE SODIUM 20 MG/1
40 TABLET, DELAYED RELEASE ORAL
Refills: 0 | Status: DISCONTINUED | OUTPATIENT
Start: 2021-06-17 | End: 2021-06-23

## 2021-06-16 RX ADMIN — Medication 12.5 MILLIGRAM(S): at 22:05

## 2021-06-16 RX ADMIN — Medication 650 MILLIGRAM(S): at 22:05

## 2021-06-16 RX ADMIN — MORPHINE SULFATE 2 MILLIGRAM(S): 50 CAPSULE, EXTENDED RELEASE ORAL at 23:28

## 2021-06-16 RX ADMIN — HEPARIN SODIUM 5000 UNIT(S): 5000 INJECTION INTRAVENOUS; SUBCUTANEOUS at 22:05

## 2021-06-16 RX ADMIN — SODIUM CHLORIDE 75 MILLILITER(S): 9 INJECTION, SOLUTION INTRAVENOUS at 03:03

## 2021-06-16 RX ADMIN — Medication 1000 MILLIGRAM(S): at 03:03

## 2021-06-16 RX ADMIN — MORPHINE SULFATE 2 MILLIGRAM(S): 50 CAPSULE, EXTENDED RELEASE ORAL at 22:58

## 2021-06-16 RX ADMIN — SIMVASTATIN 10 MILLIGRAM(S): 20 TABLET, FILM COATED ORAL at 22:05

## 2021-06-16 RX ADMIN — Medication 650 MILLIGRAM(S): at 22:35

## 2021-06-16 RX ADMIN — Medication 400 MILLIGRAM(S): at 01:17

## 2021-06-16 NOTE — CONSULT NOTE ADULT - ATTENDING COMMENTS
I personally saw and examined the patient in detail.  I have spoken to the above provider regarding the assessment and plan of care.  I reviewed the above assessment and plan of care, and agree.  I have made changes in the body of the note where appropriate.  Optimized for the OR from a cardiac point of view with no evidence of active ischemic heart disease, decompensated heart failure, severe obstructive valvular disease, or uncontrolled arrhythmia.  Called to have PPM checked.  Will follow up.  D/C atenolol as he is not taking it at home.

## 2021-06-16 NOTE — DISCHARGE NOTE PROVIDER - NSDCFUADDINST_GEN_ALL_CORE_FT
Discharge Instructions  Hip Hemiarthroplasty    1. ACTIVITY: Weight Bearing as Tolerated with assistance and rolling walker. Abduction pillow while in bed or chair.  2. DVT:Continue DVT/PE Prophylaxis. See Med Rec for Duration and dose.  3. PT: daily, Posterior Hip Precautions.  4. FOLLOW UP: Orthopedic Surgeon Dr Scott in 14 Days. Call Office For Appointment.  5. STAPLES: Remove at Rehab by RN [id number]   6. BANDAGE: Change bandage on POD7 to dry gauze and tegaderm or paper tape. Can also change PRN if saturated. Do NOT remove on arrival to inspect wound.

## 2021-06-16 NOTE — DISCHARGE NOTE PROVIDER - NSDCMRMEDTOKEN_GEN_ALL_CORE_FT
acetaminophen 325 mg oral tablet: 2 tab(s) orally every 6 hours, As needed, For Temp over 38.3 C (100.94 F)  alfuzosin 10 mg oral tablet, extended release: 1 tab(s) orally once a day  atenolol 25 mg oral tablet: 1 tab(s) orally once a day  Duricef: 500mg 1 tab PO q24h  Ecotrin 325 mg oral delayed release tablet: 1 tab(s) orally once a day  finasteride 5 mg oral tablet: 1 tab(s) orally once a day  folic acid 1 mg oral tablet: 1 tab(s) orally once a day  meclizine 12.5 mg oral tablet: 1 tab(s) orally 3 times a day  simvastatin 10 mg oral tablet: 1 tab(s) orally once a day (at bedtime)  tamsulosin 0.4 mg oral capsule: 1 cap(s) orally once a day  Tylenol with Codeine #2: 1 tab PO q6h PRN pain   Vitamin C 500 mg oral tablet: 1 tab(s) orally once a day   acetaminophen 325 mg oral tablet: 2 tab(s) orally every 6 hours, As needed, For Temp over 38.3 C (100.94 F)  alfuzosin 10 mg oral tablet, extended release: 1 tab(s) orally once a day  atenolol 25 mg oral tablet: 1 tab(s) orally once a day  bisacodyl 10 mg rectal suppository: 1 suppository(ies) rectal once, As needed, Constipation  Ecotrin 325 mg oral delayed release tablet: 1 tab(s) orally once a day  enoxaparin 40 mg/0.4 mL injectable solution:  injectable once a day  finasteride 5 mg oral tablet: 1 tab(s) orally once a day  folic acid 1 mg oral tablet: 1 tab(s) orally once a day  magnesium hydroxide 8% oral suspension: 30 milliliter(s) orally once a day, As needed, Constipation  meclizine 12.5 mg oral tablet: 1 tab(s) orally 3 times a day  Multiple Vitamins with Minerals oral tablet: 1 tab(s) orally once a day  pantoprazole 40 mg oral delayed release tablet: 1 tab(s) orally once a day (before a meal)  polyethylene glycol 3350 oral powder for reconstitution: 17 gram(s) orally once a day (at bedtime)  senna oral tablet: 2 tab(s) orally once a day (at bedtime)  simvastatin 10 mg oral tablet: 1 tab(s) orally once a day (at bedtime)  tamsulosin 0.4 mg oral capsule: 1 cap(s) orally once a day  traMADol 50 mg oral tablet: 1 tab(s) orally every 6 hours, As needed, Moderate Pain (4 - 6)  Tylenol with Codeine #2: 1 tab PO q6h PRN pain   Vitamin C 500 mg oral tablet: 1 tab(s) orally once a day

## 2021-06-16 NOTE — CONSULT NOTE ADULT - ASSESSMENT
RAMIRO/CKD  Fall/R femoral neck fracture   HTN  Dementia    -Unknown baseline renal function. +RAMIRO component. Renal function is improving now with hydration suggest pre-renal component  -Check CPK  -Urine studies  -Gentle IVF  -No renal contraindication for proposed surgery by orthopedic    Thank you

## 2021-06-16 NOTE — PROGRESS NOTE ADULT - SUBJECTIVE AND OBJECTIVE BOX
Patient is a 89y old  Male who presents with a chief complaint of Acute Altered in mental status (16 Jun 2021 09:46)  resting comfortably, without distress      INTERVAL HPI/OVERNIGHT EVENTS:  T(C): 36.4 (06-16-21 @ 08:35), Max: 37.2 (06-16-21 @ 06:17)  HR: 65 (06-16-21 @ 08:35) (61 - 98)  BP: 138/72 (06-16-21 @ 08:35) (122/74 - 174/88)  RR: 19 (06-16-21 @ 08:35) (15 - 19)  SpO2: 92% (06-16-21 @ 08:35) (92% - 97%)  Wt(kg): --  I&O's Summary      LABS:                        11.1   9.18  )-----------( 167      ( 16 Jun 2021 05:05 )             32.7     06-16    141  |  110<H>  |  33<H>  ----------------------------<  105<H>  4.1   |  23  |  1.50<H>    Ca    8.6      16 Jun 2021 05:05    TPro  7.3  /  Alb  3.4  /  TBili  0.5  /  DBili  x   /  AST  21  /  ALT  13  /  AlkPhos  146<H>  06-15    PT/INR - ( 16 Jun 2021 05:05 )   PT: 13.5 sec;   INR: 1.16 ratio         PTT - ( 16 Jun 2021 05:05 )  PTT:28.9 sec    CAPILLARY BLOOD GLUCOSE                MEDICATIONS  (STANDING):  ascorbic acid 500 milliGRAM(s) Oral daily  finasteride 5 milliGRAM(s) Oral daily  folic acid 1 milliGRAM(s) Oral daily  lactated ringers. 1000 milliLiter(s) (75 mL/Hr) IV Continuous <Continuous>  simvastatin 10 milliGRAM(s) Oral at bedtime    MEDICATIONS  (PRN):  acetaminophen   Tablet .. 650 milliGRAM(s) Oral every 6 hours PRN Mild Pain (1 - 3)  meclizine 12.5 milliGRAM(s) Oral three times a day PRN Dizziness    RADIOLOGY & ADDITIONAL TESTS:    Imaging Personally Reviewed:  [ ] YES  [ ] NO    Consultant(s) Notes Reviewed:  [ ] YES  [ ] NO    PHYSICAL EXAM:  GENERAL: NAD,   HEAD:  Atraumatic, Normocephalic  EYES: EOMI, PERRLA, conjunctiva and sclera clear  ENMT: No tonsillar erythema, exudates, or enlargement; Moist mucous membranes, Good dentition, No lesions  NECK: Supple, No JVD, Normal thyroid  NERVOUS SYSTEM:  Alert & Oriented X1, non focal exam  CHEST/LUNG: Clear to percussion bilaterally; No rales, rhonchi, wheezing, or rubs  HEART: Regular rate and rhythm; No murmurs, rubs, or gallops  ABDOMEN: Soft, Nontender, Nondistended; Bowel sounds present  EXTREMITIES:  2+ Peripheral Pulses, No clubbing, cyanosis, or edema  LYMPH: No lymphadenopathy noted  SKIN: No rashes or lesions    Care Discussed with Consultants/Other Providers [ ] YES  [ ] NO

## 2021-06-16 NOTE — CONSULT NOTE ADULT - SUBJECTIVE AND OBJECTIVE BOX
Patient is a 89y old  Male who presents with a chief complaint of Acute Altered in mental status (16 Jun 2021 06:26)      HPI:  Chart and labs reviewed.  FAM GRANADOS is a 89y Male presented to the ED from home with son for generalized weakness and worsening confusion and a fall on Monday morning around 3 am. At baseline patient ambulates with walker. Son explains they have cameras in the house and saw him trip and fall Monday morning. Since then patient has been more confused than baseline and unable ambulate. Patient unable to provide history due to dementia. As per son, no sob, vomiting or diarrhea. no blood thinners. Patient currently takes no medications daily.        (15 Nigel 2021 22:44)    Cardiology consulted for cardiac clearance. Patient seen and examined at bedside. Unable to provide history due to dementia. Patient's son called to obtain more information. Patient's son states that he has been following with Dr. Vora outpatient cardiologist. Last saw Dr. Vora about 1 year ago. Unsure exactly what was done. States that patient is no longer taking any medications regularly and only has mirtazapine that he gives as needed for insomnia. Gave 2 pills of mirtazapine the day that his father fell. Patient denies chest pain, palpitations, shortness of breath but unreliable historian. Patient's son denies any hx of MI, had a mild stroke years ago. Did have an AVR and pacemaker placement.       PAST MEDICAL & SURGICAL HISTORY:  HTN (hypertension)    HLD (hyperlipidemia)    h/o Aortic aneurysm    h/o TIA (transient ischemic attack) 6 yrs ago    h/o Disc prolapse Lumbar    h/o Symptomatic Bradycardia    h/o  ? Atrial fibrillation    Hyperlipidemia    Benign essential hypertension    Aortic valve disorder    H/O unilateral nephrectomy    Aortic prosthetic valve regurgitation    Artificial pacemaker    Calculus of ureter  2/2015    h/o Lumbar laminectomy    Pacemaker  Oct 2011    s/p Aortic aneurysm repair  10/25/2011    Aortic valve replacement Oct 2011    Patent foramen ovale repair              ECHO  FINDINGS:  From 08/2016  -mild-mod mitral regurgitation, mild aortic root dilatation, normal LVEF        MEDICATIONS  (STANDING):  ascorbic acid 500 milliGRAM(s) Oral daily  ATENolol  Tablet 25 milliGRAM(s) Oral daily  finasteride 5 milliGRAM(s) Oral daily  folic acid 1 milliGRAM(s) Oral daily  lactated ringers. 1000 milliLiter(s) (75 mL/Hr) IV Continuous <Continuous>  simvastatin 10 milliGRAM(s) Oral at bedtime    MEDICATIONS  (PRN):  acetaminophen   Tablet .. 650 milliGRAM(s) Oral every 6 hours PRN Mild Pain (1 - 3)  meclizine 12.5 milliGRAM(s) Oral three times a day PRN Dizziness      FAMILY HISTORY:  No pertinent family history in first degree relatives  Denies Family history of CAD or early MI    Limited 2/2 dementia  Constitutional: denies fever, chills  Respiratory: denies SOB, cough  Cardiovascular: denies CP, palpitations  Gastrointestinal: denies nausea, vomiting, abdominal pain  Genitourinary: denies dysuria  ROS negative except as noted above      SOCIAL HISTORY:  No tobacco, Alcohol or Drug use    Vital Signs Last 24 Hrs  T(C): 37.2 (16 Jun 2021 06:17), Max: 37.2 (16 Jun 2021 06:17)  T(F): 98.9 (16 Jun 2021 06:17), Max: 98.9 (16 Jun 2021 06:17)  HR: 61 (16 Jun 2021 06:17) (61 - 98)  BP: 157/73 (16 Jun 2021 06:17) (122/74 - 174/88)  BP(mean): --  RR: 18 (16 Jun 2021 06:17) (16 - 18)  SpO2: 96% (16 Jun 2021 06:17) (94% - 96%)    Physical Exam:  General: Elderly frail male in NAD  Neck: Supple, nontender, no mass  Neurology: A&Ox1-2, nonfocal, sensation intact   Respiratory: CTA B/L, No W/R/R  CV: RRR, +S1/S2  Abdominal: Soft, NT, ND +BSx4  Extremities: No C/C/E, + peripheral pulses  MSK: Normal ROM, no joint erythema or warmth, no joint swelling   Heme: No obvious ecchymosis or petechiae   Skin: warm, dry, normal color      ECG: RBBB    I&O's Detail      LABS:                        11.1   9.18  )-----------( 167      ( 16 Jun 2021 05:05 )             32.7     06-16    141  |  110<H>  |  33<H>  ----------------------------<  105<H>  4.1   |  23  |  1.50<H>    Ca    8.6      16 Jun 2021 05:05    TPro  7.3  /  Alb  3.4  /  TBili  0.5  /  DBili  x   /  AST  21  /  ALT  13  /  AlkPhos  146<H>  06-15        PT/INR - ( 16 Jun 2021 05:05 )   PT: 13.5 sec;   INR: 1.16 ratio         PTT - ( 16 Jun 2021 05:05 )  PTT:28.9 sec    I&O's Summary    BNP  RADIOLOGY & ADDITIONAL STUDIES:  < from: CT Pelvis Bony Only No Cont (06.15.21 @ 21:17) >    EXAM:  CT 3D RECONSTRUCT MARLO DUNNE                          EXAM:  CT PELVIS BONY ONLY                            PROCEDURE DATE:  06/15/2021          INTERPRETATION:  CLINICAL INDICATION: Right hip pain status post fall, assess fracture. History of laminectomy.    TECHNIQUE: CT axial images of the pelvis were obtained without intravenous contrast. Coronal and sagittal reformatted images were also obtained. 3-D images were obtained from a separate workstation.    CONTRAST/COMPLICATIONS:  IV Contrast: NONE  0 cc administered   0 cc discarded  Complications: None reported at time of study completion    COMPARISON: XR: 6/15/2021. CT: 5/20/2015. MR: None.    FINDINGS: Evaluation of soft tissue is limited without intravenous contrast. Patient's respiratory motion degrades images.    Bones: Osteopenia. Acute, impacted fracture of the right femoral neck with superior and anteromedial displacement of the femoral diaphysis.    Bilateral hip osteoarthrosis. Degenerative changes of the visualized lower lumbar spine, pubic symphysis and sacroiliac joints. Post surgical changes in the visualized lower lumbar spine.    Soft tissue: Edema in the soft tissue adjacent to the fracture. Diffuse muscle bulk loss with fatty replacement. Small fat-containing inguinal hernias. Trace right hip hemarthrosis.    Additional: Colon diverticulosis. Enlarged prostate. Atherosclerosis.    IMPRESSION:    Acute impacted right femoral neck fracture as described.            DORENE VENCES MD; Attending Radiologist  This document has been electronically signed. Nigel 15 2021 10:45PM    < end of copied text >   Patient is a 89y old  Male who presents with a chief complaint of Acute Altered in mental status (16 Jun 2021 06:26)      HPI:  Chart and labs reviewed.  FAM GRANADOS is a 89y Male presented to the ED from home with son for generalized weakness and worsening confusion and a fall on Monday morning around 3 am. At baseline patient ambulates with walker. Son explains they have cameras in the house and saw him trip and fall Monday morning. Since then patient has been more confused than baseline and unable ambulate. Patient unable to provide history due to dementia. As per son, no sob, vomiting or diarrhea. no blood thinners. Patient currently takes no medications daily.        (15 Nigel 2021 22:44)    Cardiology consulted for cardiac clearance. Patient seen and examined at bedside. Unable to provide history due to dementia. Patient's son called to obtain more information. Patient's son states that he has been following with Dr. Vora outpatient cardiologist. Last saw Dr. Vora about 1 year ago. Unsure exactly what was done. States that patient is no longer taking any medications regularly and only has mirtazapine that he gives as needed for insomnia. Gave 2 pills of mirtazapine the day that his father fell. Patient denies chest pain, palpitations, shortness of breath but unreliable historian. Patient's son denies any hx of MI, had a mild stroke years ago. Did have an AVR and pacemaker placement.       PAST MEDICAL & SURGICAL HISTORY:  HTN (hypertension)    HLD (hyperlipidemia)    h/o Aortic aneurysm    h/o TIA (transient ischemic attack) 6 yrs ago    h/o Disc prolapse Lumbar    h/o Symptomatic Bradycardia    h/o  ? Atrial fibrillation    Hyperlipidemia    Benign essential hypertension    Aortic valve disorder    H/O unilateral nephrectomy    Aortic prosthetic valve regurgitation    Artificial pacemaker    Calculus of ureter  2/2015    h/o Lumbar laminectomy    Pacemaker  Oct 2011    s/p Aortic aneurysm repair  10/25/2011    Aortic valve replacement Oct 2011    Patent foramen ovale repair              ECHO  FINDINGS:  From 08/2016  -mild-mod mitral regurgitation, mild aortic root dilatation, normal LVEF        MEDICATIONS  (STANDING):  ascorbic acid 500 milliGRAM(s) Oral daily  ATENolol  Tablet 25 milliGRAM(s) Oral daily  finasteride 5 milliGRAM(s) Oral daily  folic acid 1 milliGRAM(s) Oral daily  lactated ringers. 1000 milliLiter(s) (75 mL/Hr) IV Continuous <Continuous>  simvastatin 10 milliGRAM(s) Oral at bedtime    MEDICATIONS  (PRN):  acetaminophen   Tablet .. 650 milliGRAM(s) Oral every 6 hours PRN Mild Pain (1 - 3)  meclizine 12.5 milliGRAM(s) Oral three times a day PRN Dizziness      FAMILY HISTORY:  No pertinent family history in first degree relatives  Denies Family history of CAD or early MI    Limited 2/2 dementia  Constitutional: denies fever, chills  Respiratory: denies SOB, cough  Cardiovascular: denies CP, palpitations  Gastrointestinal: denies nausea, vomiting, abdominal pain  Genitourinary: denies dysuria  ROS negative except as noted above      SOCIAL HISTORY:  No tobacco, Alcohol or Drug use    Vital Signs Last 24 Hrs  T(C): 37.2 (16 Jun 2021 06:17), Max: 37.2 (16 Jun 2021 06:17)  T(F): 98.9 (16 Jun 2021 06:17), Max: 98.9 (16 Jun 2021 06:17)  HR: 61 (16 Jun 2021 06:17) (61 - 98)  BP: 157/73 (16 Jun 2021 06:17) (122/74 - 174/88)  BP(mean): --  RR: 18 (16 Jun 2021 06:17) (16 - 18)  SpO2: 96% (16 Jun 2021 06:17) (94% - 96%)    Physical Exam:  General: Elderly frail male in NAD  Neck: Supple, nontender, no mass  Neurology: A&Ox1-2, nonfocal, sensation intact   Respiratory: CTA B/L, No W/R/R  CV: RRR, +S1/S2  Abdominal: Soft, NT, ND +BSx4  Extremities: No C/C/E, + peripheral pulses  MSK: Normal ROM, no joint erythema or warmth, no joint swelling   Heme: No obvious ecchymosis or petechiae   Skin: warm, dry, normal color      ECG: NSR with RBBB with signs of intermittent V-pacing    I&O's Detail      LABS:                        11.1   9.18  )-----------( 167      ( 16 Jun 2021 05:05 )             32.7     06-16    141  |  110<H>  |  33<H>  ----------------------------<  105<H>  4.1   |  23  |  1.50<H>    Ca    8.6      16 Jun 2021 05:05    TPro  7.3  /  Alb  3.4  /  TBili  0.5  /  DBili  x   /  AST  21  /  ALT  13  /  AlkPhos  146<H>  06-15        PT/INR - ( 16 Jun 2021 05:05 )   PT: 13.5 sec;   INR: 1.16 ratio         PTT - ( 16 Jun 2021 05:05 )  PTT:28.9 sec    I&O's Summary    BNP  RADIOLOGY & ADDITIONAL STUDIES:  < from: CT Pelvis Bony Only No Cont (06.15.21 @ 21:17) >    EXAM:  CT 3D RECONSTRUCT MARLO DUNNE                          EXAM:  CT PELVIS BONY ONLY                            PROCEDURE DATE:  06/15/2021          INTERPRETATION:  CLINICAL INDICATION: Right hip pain status post fall, assess fracture. History of laminectomy.    TECHNIQUE: CT axial images of the pelvis were obtained without intravenous contrast. Coronal and sagittal reformatted images were also obtained. 3-D images were obtained from a separate workstation.    CONTRAST/COMPLICATIONS:  IV Contrast: NONE  0 cc administered   0 cc discarded  Complications: None reported at time of study completion    COMPARISON: XR: 6/15/2021. CT: 5/20/2015. MR: None.    FINDINGS: Evaluation of soft tissue is limited without intravenous contrast. Patient's respiratory motion degrades images.    Bones: Osteopenia. Acute, impacted fracture of the right femoral neck with superior and anteromedial displacement of the femoral diaphysis.    Bilateral hip osteoarthrosis. Degenerative changes of the visualized lower lumbar spine, pubic symphysis and sacroiliac joints. Post surgical changes in the visualized lower lumbar spine.    Soft tissue: Edema in the soft tissue adjacent to the fracture. Diffuse muscle bulk loss with fatty replacement. Small fat-containing inguinal hernias. Trace right hip hemarthrosis.    Additional: Colon diverticulosis. Enlarged prostate. Atherosclerosis.    IMPRESSION:    Acute impacted right femoral neck fracture as described.            DORENE VENCES MD; Attending Radiologist  This document has been electronically signed. Nigel 15 2021 10:45PM    < end of copied text >

## 2021-06-16 NOTE — CONSULT NOTE ADULT - SUBJECTIVE AND OBJECTIVE BOX
Patient is a 89y old  Male who presents with a chief complaint of Acute Altered in mental status (16 Jun 2021 07:45)       HPI:  Chart and labs reviewed.  FAM GRANADOS is a 89y Male presented to the ED from home with son for generalized weakness and worsening confusion and a fall on Monday morning around 3 am. At baseline patient ambulates with walker. Son explains they have cameras in the house and saw him trip and fall Monday morning. Since then patient has been more confused than baseline and unable ambulate. Patient unable to provide history due to dementia. As per son, no sob, vomiting or diarrhea. no blood thinners. Patient currently takes no medications daily.     Renal is being consulted for elevated BUN/Cr. Patient is a very poor historian. No other specific h/o. Unknown baseline renal function        (15 Nigel 2021 22:44)       PAST MEDICAL & SURGICAL HISTORY:  HTN (hypertension)    HLD (hyperlipidemia)    h/o Aortic aneurysm    h/o TIA (transient ischemic attack) 6 yrs ago    h/o Disc prolapse Lumbar    h/o Symptomatic Bradycardia    h/o  ? Atrial fibrillation    Hyperlipidemia    Benign essential hypertension    Aortic valve disorder    H/O unilateral nephrectomy    Aortic prosthetic valve regurgitation    Artificial pacemaker    Calculus of ureter  2/2015    h/o Lumbar laminectomy    Pacemaker  Oct 2011    s/p Aortic aneurysm repair  10/25/2011    Aortic valve replacement Oct 2011    Patent foramen ovale repair         FAMILY HISTORY:  No pertinent family history in first degree relatives    [place]    Social History:Non smoker    MEDICATIONS  (STANDING):  ascorbic acid 500 milliGRAM(s) Oral daily  finasteride 5 milliGRAM(s) Oral daily  folic acid 1 milliGRAM(s) Oral daily  lactated ringers. 1000 milliLiter(s) (75 mL/Hr) IV Continuous <Continuous>  simvastatin 10 milliGRAM(s) Oral at bedtime    MEDICATIONS  (PRN):  acetaminophen   Tablet .. 650 milliGRAM(s) Oral every 6 hours PRN Mild Pain (1 - 3)  meclizine 12.5 milliGRAM(s) Oral three times a day PRN Dizziness   Meds reviewed    Allergies    shellfish (Rash)  Sulfa druga - Rash (Rash)  sulfa drugs (Hives)    Intolerances         REVIEW OF SYSTEMS: NA. Confuse       Vital Signs Last 24 Hrs  T(C): 36.4 (16 Jun 2021 08:35), Max: 37.2 (16 Jun 2021 06:17)  T(F): 97.6 (16 Jun 2021 08:35), Max: 98.9 (16 Jun 2021 06:17)  HR: 65 (16 Jun 2021 08:35) (61 - 98)  BP: 138/72 (16 Jun 2021 08:35) (122/74 - 174/88)  BP(mean): --  RR: 19 (16 Jun 2021 08:35) (15 - 19)  SpO2: 92% (16 Jun 2021 08:35) (92% - 97%)  Daily Height in cm: 167.64 (15 Nigel 2021 17:14)    Daily     PHYSICAL EXAM:    GENERAL: NAD  HEAD:  Atraumatic, Normocephalic  NECK: Supple, neck  veins full  NERVOUS SYSTEM: Confuse   CHEST/LUNG: Clear to percussion bilaterally; No rales, rhonchi, wheezing, or rubs  HEART: Regular rate and rhythm; No murmurs, rubs, or gallops  ABDOMEN: Soft, Nontender, Nondistended; Bowel sounds present  EXTREMITIES:  No Edema  SKIN: No rashes No obvious ecchymosis      LABS:                        11.1   9.18  )-----------( 167      ( 16 Jun 2021 05:05 )             32.7     06-16    141  |  110<H>  |  33<H>  ----------------------------<  105<H>  4.1   |  23  |  1.50<H>    Ca    8.6      16 Jun 2021 05:05    TPro  7.3  /  Alb  3.4  /  TBili  0.5  /  DBili  x   /  AST  21  /  ALT  13  /  AlkPhos  146<H>  06-15    PT/INR - ( 16 Jun 2021 05:05 )   PT: 13.5 sec;   INR: 1.16 ratio         PTT - ( 16 Jun 2021 05:05 )  PTT:28.9 sec            RADIOLOGY & ADDITIONAL TESTS:

## 2021-06-16 NOTE — DISCHARGE NOTE PROVIDER - HOSPITAL COURSE
admitted for fall with hip fracture  underwent hemiarthroplasty  post op anemia - PRBC transfusion given  PT for ambulation  also developed urinary retention  bean cath per urology  DC after ORTHO and PT clearance admitted for fall with hip fracture  underwent hemiarthroplasty  post op anemia - PRBC transfusion given  PT for ambulation  also developed urinary retention  bean cath per urology  DC after ORTHO and PT clearance  trial of void when more ambulatory at rehab

## 2021-06-16 NOTE — ED ADULT NURSE REASSESSMENT NOTE - NS ED NURSE REASSESS COMMENT FT1
pt reavaluated and cy scan done pt admitted with closed hip fx pt stable awaiting bed assignment
pt stable pt stable r and [place] cath in place for urine specimen vital signs stable
pt with elevated B/P house MD made aware and pt slightly agitayed and medicated with tylenol iv as ordered
Pt received in bed in report alert and confuse and resting in bed. Pt unable to make needs be known. No sign of pain or discomfort noted as of this time. Pt admitted and scheduled for the OR for right hip repair s/p fall and fx hip. Pt has LR @75ml infusing well. Pt kept NPO. Verbal report given to FELIX Sofia.

## 2021-06-16 NOTE — CHART NOTE - NSCHARTNOTEFT_GEN_A_CORE
OR case cancelled due to OR availability tonight. Pt may eat before midnight, NPO after midnight, IVF, preop labs. Discussed with son HCP who is aware. Discussed with Dr Scott and OR staff. Plan for hemiarthroplasty tomorrow evening.

## 2021-06-16 NOTE — PROGRESS NOTE ADULT - ASSESSMENT
89 yr old male w dementia, PPM, aortic aneurysm repair, ?afib  adm w right femoral neck fracture  acute kidney injury- most likely secondary to dehydration, indices improving and will cont ivf  cardio clearance noted and appreciated  There are no medical contraindications to proposed procedure 89 yr old male w dementia, PPM, aortic aneurysm repair, ?afib  adm w right femoral neck fracture  acute kidney injury- most likely secondary to dehydration, indices improving and will cont ivf  cardio clearance noted and appreciated  There are no medical contraindications to proposed procedure    advance care planning and advance directives discussed with pt's son/HCP, ambika wade [phone]- requesting DNR, will order

## 2021-06-16 NOTE — CONSULT NOTE ADULT - ASSESSMENT
***charting in progress***     89 yo M with PMH of HTN, HLD, dementia, A-fib, BPH, s/p aortic aneurysm repair with AVR, PPM ([employer]) presented to ED with worsening confusion and fall, found to have R femoral neck fracture. Cardiology consulted for cardiac clearance.    Cardiac Clearance  - Pt has no history of MI, active CAD, ADHF or severe valvular disease and in the setting of intermediate risk procedure, patient is optimized from cardiovascular standpoint to proceed with planned procedure with routine hemodynamic monitoring.   - will interrogate ppm  - previous echo from 2016, normal LVEF  - No evidence of volume overload  - EKG showing ____________________    - monitor and replete lytes, keep K>4, Mg>2  - Other cardiovascular workup will depend on clinical course.  - All other workup per primary team  - Will follow  89 yo M with PMH of HTN, HLD, dementia, A-fib, BPH, s/p aortic aneurysm repair with AVR, PPM ([employer]) presented to ED with worsening confusion and fall, found to have R femoral neck fracture. Cardiology consulted for cardiac clearance.    Cardiac Clearance  - Pt has no history of MI, active CAD, ADHF or severe valvular disease and in the setting of intermediate risk procedure, patient is optimized from cardiovascular standpoint to proceed with planned procedure with routine hemodynamic monitoring.   - last interrogation from 2019, will interrogate PPM  - previous echo from 2016, normal LVEF  - No evidence of volume overload  - EKG showing NSR with RBBB with signs of intermittent V-pacing    HTN  -per patient's son, patient is not taking any of his chronic medications  -will discontinue atenolol as patient has not been taking it  -monitor routine hemodynamics, possibly elevated in setting of pain    - monitor and replete lytes, keep K>4, Mg>2  - Other cardiovascular workup will depend on clinical course.  - All other workup per primary team  - Will follow

## 2021-06-16 NOTE — DISCHARGE NOTE PROVIDER - CARE PROVIDER_API CALL
Iker Scott)  Brijesh Moreno  Physicians  [address] [place], [place] [address]  Phone: [phone]  Fax: [phone]  Follow Up Time: 2 weeks   Iker Scott)  Brijesh Moreno  Physicians  [address] [place] [address]  Phone: [phone]  Fax: [phone]  Follow Up Time: 2 weeks    Amico, Frank J  INTERNAL MEDICINE  [address] [facility], [address]  [place] [address]  Phone: [phone]  Fax: [phone]  Follow Up Time:

## 2021-06-16 NOTE — DISCHARGE NOTE PROVIDER - PROVIDER TOKENS
PROVIDER:[TOKEN:[[id number]],FOLLOWUP:[2 weeks]] PROVIDER:[TOKEN:[[id number]],FOLLOWUP:[2 weeks]],PROVIDER:[TOKEN:[[id number]]]

## 2021-06-16 NOTE — DISCHARGE NOTE PROVIDER - NSDCCPCAREPLAN_GEN_ALL_CORE_FT
PRINCIPAL DISCHARGE DIAGNOSIS  Diagnosis: Closed fracture of right hip, initial encounter  Assessment and Plan of Treatment:        PRINCIPAL DISCHARGE DIAGNOSIS  Diagnosis: Closed fracture of right hip, initial encounter  Assessment and Plan of Treatment: follow up with ortho MD

## 2021-06-16 NOTE — PROGRESS NOTE ADULT - SUBJECTIVE AND OBJECTIVE BOX
Orthopedics      Patient seen and examined at bedside. Feeling well. Pain controlled. No n/v. No acute events overnight.    Vital Signs Last 24 Hrs  T(C): 37.2 (06-16-21 @ 06:17), Max: 37.2 (06-16-21 @ 06:17)  T(F): 98.9 (06-16-21 @ 06:17), Max: 98.9 (06-16-21 @ 06:17)  HR: 61 (06-16-21 @ 06:17) (61 - 98)  BP: 157/73 (06-16-21 @ 06:17) (122/74 - 174/88)  BP(mean): --  RR: 18 (06-16-21 @ 06:17) (16 - 18)  SpO2: 96% (06-16-21 @ 06:17) (94% - 96%)                        11.1   9.18  )-----------( 167      ( 16 Jun 2021 05:05 )             32.7     16 Jun 2021 05:05    141    |  110    |  33     ----------------------------<  105    4.1     |  23     |  1.50     Ca    8.6        16 Jun 2021 05:05    TPro  7.3    /  Alb  3.4    /  TBili  0.5    /  DBili  x      /  AST  21     /  ALT  13     /  AlkPhos  146    15 Nigel 2021 19:53    PT/INR - ( 16 Jun 2021 05:05 )   PT: 13.5 sec;   INR: 1.16 ratio         PTT - ( 16 Jun 2021 05:05 )  PTT:28.9 sec    PHYSICAL EXAM  GEN: NAD, Awake and Alert, AOx2  RLE:   Skin: intact without abrasions / ecchymosis  TTP over hip  Unable to SLR  Pain with log roll  Compartments soft and compressible  + EHL/FHL/TA/GSC  SILT L3-S1  DP +            Assessment/Plan:  89y Male with right femoral neck fracture    - plan for OR for operative fixation, will need a hemiarthroplasty   - NPO except medications   - IVF while NPO  - NWB RLE, bedrest  - Please hold chemical DVT ppx, SCDs okay  - FU Preop labs  - Medical comanagement appreciated by primary medical team  - Patient has a pacemaker, [employer], will needed it interrogated, please document clearance   - Patient will likely need cardiology clearance, please document clearance  - Will discuss with attending and advise further if plan changes

## 2021-06-17 ENCOUNTER — RESULT REVIEW (OUTPATIENT)
Age: 86
End: 2021-06-17

## 2021-06-17 LAB
ALBUMIN SERPL ELPH-MCNC: 2.7 G/DL — LOW (ref 3.3–5)
ALP SERPL-CCNC: 121 U/L — HIGH (ref 40–120)
ALT FLD-CCNC: 17 U/L — SIGNIFICANT CHANGE UP (ref 12–78)
ANION GAP SERPL CALC-SCNC: 12 MMOL/L — SIGNIFICANT CHANGE UP (ref 5–17)
ANION GAP SERPL CALC-SCNC: 6 MMOL/L — SIGNIFICANT CHANGE UP (ref 5–17)
APTT BLD: 28.8 SEC — SIGNIFICANT CHANGE UP (ref 27.5–35.5)
AST SERPL-CCNC: 27 U/L — SIGNIFICANT CHANGE UP (ref 15–37)
BILIRUB SERPL-MCNC: 0.6 MG/DL — SIGNIFICANT CHANGE UP (ref 0.2–1.2)
BUN SERPL-MCNC: 29 MG/DL — HIGH (ref 7–23)
BUN SERPL-MCNC: 30 MG/DL — HIGH (ref 7–23)
CALCIUM SERPL-MCNC: 8.3 MG/DL — LOW (ref 8.5–10.1)
CALCIUM SERPL-MCNC: 8.6 MG/DL — SIGNIFICANT CHANGE UP (ref 8.5–10.1)
CHLORIDE SERPL-SCNC: 110 MMOL/L — HIGH (ref 96–108)
CHLORIDE SERPL-SCNC: 112 MMOL/L — HIGH (ref 96–108)
CK SERPL-CCNC: 207 U/L — SIGNIFICANT CHANGE UP (ref 26–308)
CO2 SERPL-SCNC: 20 MMOL/L — LOW (ref 22–31)
CO2 SERPL-SCNC: 25 MMOL/L — SIGNIFICANT CHANGE UP (ref 22–31)
CREAT ?TM UR-MCNC: 58 MG/DL — SIGNIFICANT CHANGE UP
CREAT SERPL-MCNC: 1.3 MG/DL — SIGNIFICANT CHANGE UP (ref 0.5–1.3)
CREAT SERPL-MCNC: 1.3 MG/DL — SIGNIFICANT CHANGE UP (ref 0.5–1.3)
CULTURE RESULTS: SIGNIFICANT CHANGE UP
GLUCOSE SERPL-MCNC: 100 MG/DL — HIGH (ref 70–99)
GLUCOSE SERPL-MCNC: 71 MG/DL — SIGNIFICANT CHANGE UP (ref 70–99)
HCT VFR BLD CALC: 32.1 % — LOW (ref 39–50)
HCT VFR BLD CALC: 32.1 % — LOW (ref 39–50)
HGB BLD-MCNC: 10.4 G/DL — LOW (ref 13–17)
HGB BLD-MCNC: 10.5 G/DL — LOW (ref 13–17)
INR BLD: 1.17 RATIO — HIGH (ref 0.88–1.16)
MCHC RBC-ENTMCNC: 30.1 PG — SIGNIFICANT CHANGE UP (ref 27–34)
MCHC RBC-ENTMCNC: 30.4 PG — SIGNIFICANT CHANGE UP (ref 27–34)
MCHC RBC-ENTMCNC: 32.4 GM/DL — SIGNIFICANT CHANGE UP (ref 32–36)
MCHC RBC-ENTMCNC: 32.7 GM/DL — SIGNIFICANT CHANGE UP (ref 32–36)
MCV RBC AUTO: 92.8 FL — SIGNIFICANT CHANGE UP (ref 80–100)
MCV RBC AUTO: 93 FL — SIGNIFICANT CHANGE UP (ref 80–100)
NRBC # BLD: 0 /100 WBCS — SIGNIFICANT CHANGE UP (ref 0–0)
NRBC # BLD: 0 /100 WBCS — SIGNIFICANT CHANGE UP (ref 0–0)
OSMOLALITY UR: 459 MOSM/KG — SIGNIFICANT CHANGE UP (ref 50–1200)
PHOSPHATE SERPL-MCNC: 2.7 MG/DL — SIGNIFICANT CHANGE UP (ref 2.5–4.5)
PLATELET # BLD AUTO: 172 K/UL — SIGNIFICANT CHANGE UP (ref 150–400)
PLATELET # BLD AUTO: 181 K/UL — SIGNIFICANT CHANGE UP (ref 150–400)
POTASSIUM SERPL-MCNC: 3.9 MMOL/L — SIGNIFICANT CHANGE UP (ref 3.5–5.3)
POTASSIUM SERPL-MCNC: 4.1 MMOL/L — SIGNIFICANT CHANGE UP (ref 3.5–5.3)
POTASSIUM SERPL-SCNC: 3.9 MMOL/L — SIGNIFICANT CHANGE UP (ref 3.5–5.3)
POTASSIUM SERPL-SCNC: 4.1 MMOL/L — SIGNIFICANT CHANGE UP (ref 3.5–5.3)
POTASSIUM UR-SCNC: 26.1 MMOL/L — SIGNIFICANT CHANGE UP
PROT ?TM UR-MCNC: 56 MG/DL — HIGH (ref 0–12)
PROT SERPL-MCNC: 6.1 G/DL — SIGNIFICANT CHANGE UP (ref 6–8.3)
PROTHROM AB SERPL-ACNC: 13.6 SEC — SIGNIFICANT CHANGE UP (ref 10.6–13.6)
RBC # BLD: 3.45 M/UL — LOW (ref 4.2–5.8)
RBC # BLD: 3.46 M/UL — LOW (ref 4.2–5.8)
RBC # FLD: 13.6 % — SIGNIFICANT CHANGE UP (ref 10.3–14.5)
RBC # FLD: 13.6 % — SIGNIFICANT CHANGE UP (ref 10.3–14.5)
SODIUM SERPL-SCNC: 142 MMOL/L — SIGNIFICANT CHANGE UP (ref 135–145)
SODIUM SERPL-SCNC: 143 MMOL/L — SIGNIFICANT CHANGE UP (ref 135–145)
SODIUM UR-SCNC: 101 MMOL/L — SIGNIFICANT CHANGE UP
SPECIMEN SOURCE: SIGNIFICANT CHANGE UP
WBC # BLD: 6.76 K/UL — SIGNIFICANT CHANGE UP (ref 3.8–10.5)
WBC # BLD: 7.53 K/UL — SIGNIFICANT CHANGE UP (ref 3.8–10.5)
WBC # FLD AUTO: 6.76 K/UL — SIGNIFICANT CHANGE UP (ref 3.8–10.5)
WBC # FLD AUTO: 7.53 K/UL — SIGNIFICANT CHANGE UP (ref 3.8–10.5)

## 2021-06-17 PROCEDURE — 88311 DECALCIFY TISSUE: CPT | Mod: 26

## 2021-06-17 PROCEDURE — 88305 TISSUE EXAM BY PATHOLOGIST: CPT | Mod: 26

## 2021-06-17 PROCEDURE — 99232 SBSQ HOSP IP/OBS MODERATE 35: CPT

## 2021-06-17 PROCEDURE — 12345: CPT | Mod: NC

## 2021-06-17 PROCEDURE — 73501 X-RAY EXAM HIP UNI 1 VIEW: CPT | Mod: 26,RT

## 2021-06-17 RX ORDER — HYDROMORPHONE HYDROCHLORIDE 2 MG/ML
0.5 INJECTION INTRAMUSCULAR; INTRAVENOUS; SUBCUTANEOUS
Refills: 0 | Status: DISCONTINUED | OUTPATIENT
Start: 2021-06-17 | End: 2021-06-18

## 2021-06-17 RX ORDER — CEFAZOLIN SODIUM 1 G
2000 VIAL (EA) INJECTION EVERY 8 HOURS
Refills: 0 | Status: DISCONTINUED | OUTPATIENT
Start: 2021-06-17 | End: 2021-06-17

## 2021-06-17 RX ORDER — ASPIRIN/CALCIUM CARB/MAGNESIUM 324 MG
325 TABLET ORAL
Refills: 0 | Status: DISCONTINUED | OUTPATIENT
Start: 2021-06-18 | End: 2021-06-19

## 2021-06-17 RX ORDER — ONDANSETRON 8 MG/1
4 TABLET, FILM COATED ORAL ONCE
Refills: 0 | Status: DISCONTINUED | OUTPATIENT
Start: 2021-06-17 | End: 2021-06-18

## 2021-06-17 RX ORDER — CEFAZOLIN SODIUM 1 G
2000 VIAL (EA) INJECTION EVERY 8 HOURS
Refills: 0 | Status: COMPLETED | OUTPATIENT
Start: 2021-06-17 | End: 2021-06-18

## 2021-06-17 RX ORDER — HYDROMORPHONE HYDROCHLORIDE 2 MG/ML
0.25 INJECTION INTRAMUSCULAR; INTRAVENOUS; SUBCUTANEOUS
Refills: 0 | Status: DISCONTINUED | OUTPATIENT
Start: 2021-06-17 | End: 2021-06-18

## 2021-06-17 RX ORDER — SODIUM CHLORIDE 9 MG/ML
1000 INJECTION, SOLUTION INTRAVENOUS
Refills: 0 | Status: DISCONTINUED | OUTPATIENT
Start: 2021-06-17 | End: 2021-06-18

## 2021-06-17 RX ADMIN — SODIUM CHLORIDE 75 MILLILITER(S): 9 INJECTION, SOLUTION INTRAVENOUS at 22:29

## 2021-06-17 NOTE — PROGRESS NOTE ADULT - ASSESSMENT
RAMIRO/CKD  Fall/R femoral neck fracture   HTN  Dementia    -Unknown baseline renal function. +RAMIRO component. Renal function is improving now with hydration suggest pre-renal component  -Check CPK - normal range  -Urine studies - pending  -Renal indices continue to improve well; near presumed baseline? Stable electrolytes  -Gentle IVF as ordered with LR  -No renal contraindication for proposed surgery by orthopedic    Thank you

## 2021-06-17 NOTE — DIETITIAN INITIAL EVALUATION ADULT. - ORAL INTAKE PTA/DIET HISTORY
Spoke with norm, eats very well at home, likes sweets, needs soft not chopped foods due to poor dentition. Avoids ice cream-causes diarrhea. Eats eggs Mack, pasta, fruit for other meals,dislikes chicken. No wt loss PTA.

## 2021-06-17 NOTE — PROGRESS NOTE ADULT - SUBJECTIVE AND OBJECTIVE BOX
Patient is a 89y old  Male who presents with a chief complaint of Acute Altered in mental status (2021 07:45)       HPI:  Chart and labs reviewed.  FAM GRANADOS is a 89y Male presented to the ED from home with son for generalized weakness and worsening confusion and a fall on Monday morning around 3 am. At baseline patient ambulates with walker. Son explains they have cameras in the house and saw him trip and fall Monday morning. Since then patient has been more confused than baseline and unable ambulate. Patient unable to provide history due to dementia. As per son, no sob, vomiting or diarrhea. no blood thinners. Patient currently takes no medications daily.     Renal is being consulted for elevated BUN/Cr. Patient is a very poor historian. No other specific h/o. Unknown baseline renal function     Follow up acute on CKD Stage 3  No acute events noted       (15 Nigel 2021 22:44)       PAST MEDICAL & SURGICAL HISTORY:  HTN (hypertension)    HLD (hyperlipidemia)    h/o Aortic aneurysm    h/o TIA (transient ischemic attack) 6 yrs ago    h/o Disc prolapse Lumbar    h/o Symptomatic Bradycardia    h/o  ? Atrial fibrillation    Hyperlipidemia    Benign essential hypertension    Aortic valve disorder    H/O unilateral nephrectomy    Aortic prosthetic valve regurgitation    Artificial pacemaker    Calculus of ureter  2015    h/o Lumbar laminectomy    Pacemaker  Oct 2011    s/p Aortic aneurysm repair  10/25/2011    Aortic valve replacement Oct 2011    Patent foramen ovale repair         FAMILY HISTORY:  No pertinent family history in first degree relatives    [place]    Social History:Non smoker    MEDICATIONS  (STANDING):  ascorbic acid 500 milliGRAM(s) Oral daily  finasteride 5 milliGRAM(s) Oral daily  folic acid 1 milliGRAM(s) Oral daily  lactated ringers. 1000 milliLiter(s) (75 mL/Hr) IV Continuous <Continuous>  simvastatin 10 milliGRAM(s) Oral at bedtime    MEDICATIONS  (PRN):  acetaminophen   Tablet .. 650 milliGRAM(s) Oral every 6 hours PRN Mild Pain (1 - 3)  meclizine 12.5 milliGRAM(s) Oral three times a day PRN Dizziness  morphine  - Injectable 2 milliGRAM(s) IV Push every 4 hours PRN Severe Pain (7 - 10)      Allergies    shellfish (Rash)  Sulfa drugs - Rash (Rash)  sulfa drugs (Hives)    Intolerances         REVIEW OF SYSTEMS: NA. Confused      Vital Signs Last 24 Hrs  T(C): 36.7 (2021 05:15), Max: 36.7 (2021 05:15)  T(F): 98 (2021 05:15), Max: 98 (2021 05:15)  HR: 73 (2021 05:15) (61 - 73)  BP: 144/65 (2021 05:15) (138/72 - 146/54)  BP(mean): --  RR: 18 (2021 05:15) (18 - 19)  SpO2: 100% (2021 05:15) (91% - 100%)    PHYSICAL EXAM:    GENERAL: No distress  HEAD:  Atraumatic, Normocephalic  NECK: Supple  NERVOUS SYSTEM: Confused  CHEST/LUNG: Clear to percussion bilaterally; No rales, rhonchi, wheezing, or rubs  HEART: Regular rate and rhythm; No murmurs, rubs, or gallops  ABDOMEN: Soft, Nontender, Nondistended; Bowel sounds present  EXTREMITIES:  No Edema  SKIN: No rashes No obvious ecchymosis      LABS:                                            10.5   6.76  )-----------( 181      ( 2021 06:03 )             32.1     06-17    143  |  112<H>  |  29<H>  ----------------------------<  71  3.9   |  25  |  1.30    Ca    8.6      2021 06:03  Phos  2.7     06-17    TPro  6.1  /  Alb  2.7<L>  /  TBili  0.6  /  DBili  x   /  AST  27  /  ALT  17  /  AlkPhos  121<H>  06-17    PT/INR - ( 2021 06:03 )   PT: 13.6 sec;   INR: 1.17 ratio         PTT - ( 2021 06:03 )  PTT:28.8 sec  Urinalysis Basic - ( 2021 22:11 )    Color: Yellow / Appearance: Clear / S.010 / pH: x  Gluc: x / Ketone: Negative  / Bili: Negative / Urobili: Negative   Blood: x / Protein: 30 mg/dL / Nitrite: Negative   Leuk Esterase: Trace / RBC: 6-10 /HPF / WBC 0-2   Sq Epi: x / Non Sq Epi: Occasional / Bacteria: Few            RADIOLOGY & ADDITIONAL TESTS:

## 2021-06-17 NOTE — PROGRESS NOTE ADULT - SUBJECTIVE AND OBJECTIVE BOX
Patient is a 89y old  Male who presents with a chief complaint of Acute Altered in mental status (2021 09:25)      INTERVAL /OVERNIGHT EVENTS: alert awake confused    MEDICATIONS  (STANDING):  ascorbic acid 500 milliGRAM(s) Oral daily  finasteride 5 milliGRAM(s) Oral daily  folic acid 1 milliGRAM(s) Oral daily  lactated ringers. 1000 milliLiter(s) (75 mL/Hr) IV Continuous <Continuous>  simvastatin 10 milliGRAM(s) Oral at bedtime    MEDICATIONS  (PRN):  acetaminophen   Tablet .. 650 milliGRAM(s) Oral every 6 hours PRN Mild Pain (1 - 3)  meclizine 12.5 milliGRAM(s) Oral three times a day PRN Dizziness  morphine  - Injectable 2 milliGRAM(s) IV Push every 4 hours PRN Severe Pain (7 - 10)      Allergies    shellfish (Rash)  Sulfa druga - Rash (Rash)  sulfa drugs (Hives)    Intolerances        REVIEW OF SYSTEMS:  CONSTITUTIONAL: No fever, weight loss, or fatigue  EYES: No eye pain, visual disturbances, or discharge  ENMT:  No difficulty hearing, tinnitus, vertigo; No sinus or throat pain  NECK: No pain or stiffness  RESPIRATORY: No cough, wheezing, chills or hemoptysis; No shortness of breath  CARDIOVASCULAR: No chest pain, palpitations, dizziness, or leg swelling  GASTROINTESTINAL: No abdominal or epigastric pain. No nausea, vomiting, or hematemesis; No diarrhea or constipation. No melena or hematochezia.  GENITOURINARY: No dysuria, frequency, hematuria, or incontinence  NEUROLOGICAL: No headaches, memory loss, loss of strength, numbness, or tremors  SKIN: No itching, burning, rashes, or lesions   LYMPH NODES: No enlarged glands  ENDOCRINE: No heat or cold intolerance; No hair loss; No polydipsia or polyuria  MUSCULOSKELETAL: No joint pain or swelling; No muscle, back, or extremity pain  PSYCHIATRIC: No depression, anxiety, mood swings, or difficulty sleeping  HEME/LYMPH: No easy bruising, or bleeding gums  ALLERGY AND IMMUNOLOGIC: No hives or eczema    Vital Signs Last 24 Hrs  T(C): 36.6 (2021 13:02), Max: 36.7 (2021 05:15)  T(F): 97.8 (2021 13:02), Max: 98 (2021 05:15)  HR: 68 (2021 13:02) (68 - 73)  BP: 146/74 (2021 13:02) (144/65 - 146/74)  BP(mean): --  RR: 18 (2021 13:02) (18 - 18)  SpO2: 99% (2021 13:02) (99% - 100%)    PHYSICAL EXAM:  GENERAL: NAD, well-groomed, well-developed  HEAD:  Atraumatic, Normocephalic  EYES: EOMI, PERRLA, conjunctiva and sclera clear  ENMT: No tonsillar erythema, exudates, or enlargement; Moist mucous membranes, Good dentition, No lesions  NECK: Supple, No JVD, Normal thyroid  NERVOUS SYSTEM:  Alert & Oriented X3, Good concentration; Motor Strength 5/5 B/L upper and lower extremities; DTRs 2+ intact and symmetric  CHEST/LUNG: Clear to auscultation bilaterally; No rales, rhonchi, wheezing, or rubs  HEART: Regular rate and rhythm; No murmurs, rubs, or gallops  ABDOMEN: Soft, Nontender, Nondistended; Bowel sounds present  EXTREMITIES:  2+ Peripheral Pulses, No clubbing, cyanosis, or edema  LYMPH: No lymphadenopathy noted  SKIN: No rashes or lesions    LABS:                        10.5   6.76  )-----------( 181      ( 2021 06:03 )             32.1     2021 06:03    143    |  112    |  29     ----------------------------<  71     3.9     |  25     |  1.30     Ca    8.6        2021 06:03  Phos  2.7       2021 06:03    TPro  6.1    /  Alb  2.7    /  TBili  0.6    /  DBili  x      /  AST  27     /  ALT  17     /  AlkPhos  121    2021 06:03    PT/INR - ( 2021 06:03 )   PT: 13.6 sec;   INR: 1.17 ratio         PTT - ( 2021 06:03 )  PTT:28.8 sec  Urinalysis Basic - ( 2021 22:11 )    Color: Yellow / Appearance: Clear / S.010 / pH: x  Gluc: x / Ketone: Negative  / Bili: Negative / Urobili: Negative   Blood: x / Protein: 30 mg/dL / Nitrite: Negative   Leuk Esterase: Trace / RBC: 6-10 /HPF / WBC 0-2   Sq Epi: x / Non Sq Epi: Occasional / Bacteria: Few      CAPILLARY BLOOD GLUCOSE          RADIOLOGY & ADDITIONAL TESTS:    Notes Reviewed:  [x ] YES  [ ] NO    Care Discussed with Consultants/Other Providers [x ] YES  [ ] NO

## 2021-06-17 NOTE — PROGRESS NOTE ADULT - ASSESSMENT
89 yr old male w dementia, PPM, aortic aneurysm repair, ?afib  adm w right femoral neck fracture  acute kidney injury- most likely secondary to dehydration, indices improving and will cont ivf  cardio clearance noted and appreciated  There are no medical contraindications to proposed procedure    advance care planning and advance directives discussed with pt's son/HCP, ambika wade [phone]- requesting DNR, will order

## 2021-06-17 NOTE — DIETITIAN INITIAL EVALUATION ADULT. - OTHER INFO
Pt s/p hip fx, NPO today for hemiarthroplasty. Hx HTN, HLD, TIA, dementia. Buttock st 1/2 pressure injury. BMI 27. Allergic to shellfish. Was on DASH prior to NPO order.

## 2021-06-17 NOTE — PROGRESS NOTE ADULT - SUBJECTIVE AND OBJECTIVE BOX
Orthopedics      Patient seen and examined at bedside. Feeling well. Pain controlled. No n/v. No acute events overnight.    Vital Signs Last 24 Hrs  T(C): 36.7 (17 Jun 2021 05:15), Max: 36.7 (16 Jun 2021 07:45)  T(F): 98 (17 Jun 2021 05:15), Max: 98 (16 Jun 2021 07:45)  HR: 73 (17 Jun 2021 05:15) (61 - 86)  BP: 144/65 (17 Jun 2021 05:15) (138/72 - 148/70)  BP(mean): --  RR: 18 (17 Jun 2021 05:15) (15 - 19)  SpO2: 100% (17 Jun 2021 05:15) (91% - 100%)    PHYSICAL EXAM  GEN: NAD, Awake and Alert, AOx2  RLE:   Skin: intact without abrasions / ecchymosis  TTP over hip  Unable to SLR  Pain with log roll  Compartments soft and compressible  + EHL/FHL/TA/GSC  SILT L3-S1  DP +            Assessment/Plan:  89y Male with right femoral neck fracture    - plan for OR for operative fixation, will need a hemiarthroplasty   - NPO except medications   - IVF while NPO  - NWB RLE, bedrest  - Please hold chemical DVT ppx, SCDs okay  - FU Preop labs  - Medical comanagement appreciated by primary medical team  - Will discuss with attending and advise further if plan changes

## 2021-06-17 NOTE — PROGRESS NOTE ADULT - SUBJECTIVE AND OBJECTIVE BOX
Spoke with Haim Hallman in regards to Davey Hallman, anesthesia, and DNR.  It was made aware to the son that Davey is high risk for intraoperative or post operative complications from general anesthesia secondary to advanced age and medical comorbidites.  General endotracheal anesthesia was discussed at length and Mr. Hallman agreed to reverse DNR for the procedure and recovery period.  I also told him that the endotracheal tube may be necessary for after the procedure if the patient required ventilatory assistance.  Blood transfusion was also discussed.  Mr Hallman agreed to all of this and chooses to rescind the DNR for the procedure and post operative period in lieu of the need for endotracheal intubation and to maintain his safety during the procedure.  All questions were answered and an anesthesia consent was witnessed over the phone.

## 2021-06-17 NOTE — GOALS OF CARE CONVERSATION - ADVANCED CARE PLANNING - CONVERSATION DETAILS
Writer spoke to pt son, Haim on phone, patient with confusion.   Reviewed patient's medical and social history as well as events leading to patient's hospitalization. Writer discussed patient's current diagnosis (  AMS, confusion, fall: fx right Hip, dementia  Hx AF, AVR, HTN, TIA.), medical condition and management,  prognosis, and life expectancy. Inquired about patient's wishes regarding extent of medical care to be provided including escalation of medical care into the ICU and use of vasopressor support. In addition, the writer inquired about thoughts regarding cardiopulmonary resuscitation, artificial nutrition and hydration including use of feeding tubes and IVF, antibiotics, and further investigative studies such as blood draws and radiology. Haim  showed good insight into patients medical condition. All questions answered.  Haim spoke of pt resuscitation wishes and consented to DNR/DNI code. status. MOLST form filled out and placed in chart. Haim aware will need to clarify DNR for surgery , repair hip fx. Psychosocial support provided. PC RN contact # given

## 2021-06-17 NOTE — PROGRESS NOTE ADULT - ASSESSMENT
89 yo M with PMH of HTN, HLD, dementia, A-fib, BPH, s/p aortic aneurysm repair with AVR, PPM ([employer]) presented to ED with worsening confusion and fall, found to have R femoral neck fracture. Cardiology consulted for cardiac clearance.     R femoral neck fracture  - Plan for hemiarthroplasty   - Pt has no history of MI, active CAD, ADHF or severe valvular disease and in the setting of intermediate risk procedure, patient is optimized from cardiovascular standpoint to proceed with planned procedure with routine hemodynamic monitoring.   - last interrogation from 2019, will interrogate PPM  - Echo from 2016, normal LVEF  - No evidence of volume overload    A fib   - HR: 73 (06-17 @ 05:15) (61 - 73)  - EKG showing NSR with RBBB with signs of intermittent V-pacing    HTN  Hypertension  - BP: 144/65 (06-17-21 @ 05:15) (144/65 - 146/54)  - Not on antihypertensives   - Monitor routine hemodynamics     Hyperlipidemia  - Continue Zocor    - Monitor and replete lytes, keep K>4, Mg>2.  - All other medical needs as per primary team.  - Other cardiovascular workup will depend on clinical course.  - Will continue to follow.    Concha Mendoza, MS FNP, [facility]  Nurse Practitioner- Cardiology   Spectra #[phone] 89 yo M with PMH of HTN, HLD, dementia, A-fib, BPH, s/p aortic aneurysm repair with AVR, PPM ([employer]) presented to ED with worsening confusion and fall, found to have R femoral neck fracture. Cardiology consulted for cardiac clearance.     R femoral neck fracture  - Plan for hemiarthroplasty   - PPM interrogation 06/16/2021 showed remaining longevity 1.5 years, multiple ATR, A paced 68%, V paved 41%  - Echo from 2016, normal LVEF  - No evidence of volume overload  - Pt has no history of MI, active CAD, ADHF or severe valvular disease and in the setting of intermediate risk procedure, patient is optimized from cardiovascular standpoint to proceed with planned procedure with routine hemodynamic monitoring.     A fib   - HR: 73 (06-17 @ 05:15) (61 - 73)  - EKG showing NSR with RBBB with signs of intermittent V-pacing    HTN  Hypertension  - BP: 144/65 (06-17-21 @ 05:15) (144/65 - 146/54)  - Not on antihypertensives   - Monitor routine hemodynamics     Hyperlipidemia  - Continue Zocor    - Monitor and replete lytes, keep K>4, Mg>2.  - All other medical needs as per primary team.  - Other cardiovascular workup will depend on clinical course.  - Will continue to follow.    Concha Mendoza, MS FNP, [facility]  Nurse Practitioner- Cardiology   Spectra #[phone] 89 yo M with PMH of HTN, HLD, dementia, A-fib, BPH, s/p aortic aneurysm repair with AVR, PPM ([employer]) presented to ED with worsening confusion and fall, found to have R femoral neck fracture. Cardiology consulted for cardiac clearance.     R femoral neck fracture  - Plan for hemiarthroplasty   - PPM interrogation 06/16/2021 showed remaining longevity 1.5 years, multiple ATR, A paced 68%, V paved 41%  - Echo from 2016, normal LVEF  - No evidence of volume overload  - Pt has no history of MI, active CAD, ADHF or severe valvular disease and in the setting of intermediate risk procedure, patient is optimized from cardiovascular standpoint to proceed with planned procedure with routine hemodynamic monitoring.     PAF  - HR: 73 (06-17 @ 05:15) (61 - 73)  - EKG showing NSR with RBBB with signs of intermittent V-pacing    HTN  Hypertension  - BP: 144/65 (06-17-21 @ 05:15) (144/65 - 146/54)  - Not on antihypertensives   - Monitor routine hemodynamics     Hyperlipidemia  - Continue Zocor    - Monitor and replete lytes, keep K>4, Mg>2.  - All other medical needs as per primary team.  - Other cardiovascular workup will depend on clinical course.  - Will continue to follow.    Concha Mendoza, MS FNP, [facility]  Nurse Practitioner- Cardiology   Spectra #[phone]

## 2021-06-17 NOTE — PROGRESS NOTE ADULT - SUBJECTIVE AND OBJECTIVE BOX
[facility] -- Jian Newman, Brandy Herndon, Rodolfo Cota Savella, Goodger: Office # [phone]    Follow Up:  cardiac optimization pre/post procedure     Subjective/Observations: Patient seen and examined. Patient awake, alert, resting in bed. confused. Denies any complaints. Tolerating room air. IVF @ 75     REVIEW OF SYSTEMS: All review of systems is negative for eye, ENT, GI, , allergic, dermatologic, musculoskeletal and neurologic except as described above    PAST MEDICAL & SURGICAL HISTORY:  HTN (hypertension)    HLD (hyperlipidemia)    h/o Aortic aneurysm    h/o TIA (transient ischemic attack) 6 yrs ago    h/o Disc prolapse Lumbar    h/o Symptomatic Bradycardia    h/o  ? Atrial fibrillation    Hyperlipidemia    Benign essential hypertension    Aortic valve disorder    H/O unilateral nephrectomy    Aortic prosthetic valve regurgitation    Artificial pacemaker    Calculus of ureter  2/2015    h/o Lumbar laminectomy    Pacemaker  Oct 2011    s/p Aortic aneurysm repair  10/25/2011    Aortic valve replacement Oct 2011    Patent foramen ovale repair    MEDICATIONS  (STANDING):  ascorbic acid 500 milliGRAM(s) Oral daily  finasteride 5 milliGRAM(s) Oral daily  folic acid 1 milliGRAM(s) Oral daily  lactated ringers. 1000 milliLiter(s) (75 mL/Hr) IV Continuous <Continuous>  simvastatin 10 milliGRAM(s) Oral at bedtime    MEDICATIONS  (PRN):  acetaminophen   Tablet .. 650 milliGRAM(s) Oral every 6 hours PRN Mild Pain (1 - 3)  meclizine 12.5 milliGRAM(s) Oral three times a day PRN Dizziness  morphine  - Injectable 2 milliGRAM(s) IV Push every 4 hours PRN Severe Pain (7 - 10)    Allergies  shellfish (Rash)  Sulfa druga - Rash (Rash)  sulfa drugs (Hives)    Vital Signs Last 24 Hrs  T(C): 36.7 (17 Jun 2021 05:15), Max: 36.7 (17 Jun 2021 05:15)  T(F): 98 (17 Jun 2021 05:15), Max: 98 (17 Jun 2021 05:15)  HR: 73 (17 Jun 2021 05:15) (61 - 73)  BP: 144/65 (17 Jun 2021 05:15) (144/65 - 146/54)  BP(mean): --  RR: 18 (17 Jun 2021 05:15) (18 - 18)  SpO2: 100% (17 Jun 2021 05:15) (91% - 100%)  I&O's Summary    16 Jun 2021 07:01  -  17 Jun 2021 07:00  --------------------------------------------------------  IN: 825 mL / OUT: 300 mL / NET: 525 mL      Weight (kg): 77.1 (06-17 @ 03:25)    TELE: Not on telemetry   PHYSICAL EXAM:  Appearance: NAD, no distress, alert, Frail   HEENT: Moist Mucous Membranes, Anicteric  Cardiovascular: Regular rate and rhythm, Normal S1 S2, No JVD, No murmurs, No rubs, gallops or clicks  Respiratory: Non-labored, Clear to auscultation, No rales, No rhonchi, No wheezing.   Gastrointestinal:  Soft, Non-tender, + BS  Neurologic: Non-focal  Skin: Warm and dry, No visible rashes or ulcers, No ecchymosis, No cyanosis  Musculoskeletal: No clubbing, No cyanosis, No joint swelling/tenderness  Psychiatry: Mood & affect appropriate  Lymph: No peripheral edema.     LABS: All Labs Reviewed:                        10.5   6.76  )-----------( 181      ( 17 Jun 2021 06:03 )             32.1                         11.1   9.18  )-----------( 167      ( 16 Jun 2021 05:05 )             32.7                         11.9   8.32  )-----------( 171      ( 15 Nigel 2021 19:53 )             35.9     17 Jun 2021 06:03    143    |  112    |  29     ----------------------------<  71     3.9     |  25     |  1.30   16 Jun 2021 05:05    141    |  110    |  33     ----------------------------<  105    4.1     |  23     |  1.50   15 Nigel 2021 19:53    139    |  104    |  35     ----------------------------<  121    3.9     |  25     |  2.00     Ca    8.6        17 Jun 2021 06:03  Ca    8.6        16 Jun 2021 05:05  Ca    9.0        15 Nigel 2021 19:53  Phos  2.7       17 Jun 2021 06:03    TPro  6.1    /  Alb  2.7    /  TBili  0.6    /  DBili  x      /  AST  27     /  ALT  17     /  AlkPhos  121    17 Jun 2021 06:03  TPro  7.3    /  Alb  3.4    /  TBili  0.5    /  DBili  x      /  AST  21     /  ALT  13     /  AlkPhos  146    15 Nigel 2021 19:53  PT/INR - ( 17 Jun 2021 06:03 )   PT: 13.6 sec;   INR: 1.17 ratio    PTT - ( 17 Jun 2021 06:03 )  PTT:28.8 sec  Creatine Kinase, Serum: 207 U/L (06-17-21 @ 06:03)

## 2021-06-18 DIAGNOSIS — N17.9 ACUTE KIDNEY FAILURE, UNSPECIFIED: ICD-10-CM

## 2021-06-18 LAB
ANION GAP SERPL CALC-SCNC: 10 MMOL/L — SIGNIFICANT CHANGE UP (ref 5–17)
BUN SERPL-MCNC: 34 MG/DL — HIGH (ref 7–23)
CALCIUM SERPL-MCNC: 8.1 MG/DL — LOW (ref 8.5–10.1)
CHLORIDE SERPL-SCNC: 110 MMOL/L — HIGH (ref 96–108)
CO2 SERPL-SCNC: 23 MMOL/L — SIGNIFICANT CHANGE UP (ref 22–31)
CREAT SERPL-MCNC: 1.6 MG/DL — HIGH (ref 0.5–1.3)
GLUCOSE SERPL-MCNC: 114 MG/DL — HIGH (ref 70–99)
HCT VFR BLD CALC: 30.9 % — LOW (ref 39–50)
HGB BLD-MCNC: 10.1 G/DL — LOW (ref 13–17)
MCHC RBC-ENTMCNC: 30.4 PG — SIGNIFICANT CHANGE UP (ref 27–34)
MCHC RBC-ENTMCNC: 32.7 GM/DL — SIGNIFICANT CHANGE UP (ref 32–36)
MCV RBC AUTO: 93.1 FL — SIGNIFICANT CHANGE UP (ref 80–100)
NRBC # BLD: 0 /100 WBCS — SIGNIFICANT CHANGE UP (ref 0–0)
PLATELET # BLD AUTO: 189 K/UL — SIGNIFICANT CHANGE UP (ref 150–400)
POTASSIUM SERPL-MCNC: 4.2 MMOL/L — SIGNIFICANT CHANGE UP (ref 3.5–5.3)
POTASSIUM SERPL-SCNC: 4.2 MMOL/L — SIGNIFICANT CHANGE UP (ref 3.5–5.3)
RBC # BLD: 3.32 M/UL — LOW (ref 4.2–5.8)
RBC # FLD: 13.4 % — SIGNIFICANT CHANGE UP (ref 10.3–14.5)
SODIUM SERPL-SCNC: 143 MMOL/L — SIGNIFICANT CHANGE UP (ref 135–145)
WBC # BLD: 6.02 K/UL — SIGNIFICANT CHANGE UP (ref 3.8–10.5)
WBC # FLD AUTO: 6.02 K/UL — SIGNIFICANT CHANGE UP (ref 3.8–10.5)

## 2021-06-18 PROCEDURE — 99232 SBSQ HOSP IP/OBS MODERATE 35: CPT

## 2021-06-18 RX ADMIN — PANTOPRAZOLE SODIUM 40 MILLIGRAM(S): 20 TABLET, DELAYED RELEASE ORAL at 05:56

## 2021-06-18 RX ADMIN — FINASTERIDE 5 MILLIGRAM(S): 5 TABLET, FILM COATED ORAL at 11:27

## 2021-06-18 RX ADMIN — Medication 1 MILLIGRAM(S): at 11:27

## 2021-06-18 RX ADMIN — POLYETHYLENE GLYCOL 3350 17 GRAM(S): 17 POWDER, FOR SOLUTION ORAL at 22:15

## 2021-06-18 RX ADMIN — SIMVASTATIN 10 MILLIGRAM(S): 20 TABLET, FILM COATED ORAL at 22:14

## 2021-06-18 RX ADMIN — Medication 100 MILLIGRAM(S): at 05:55

## 2021-06-18 RX ADMIN — Medication 100 MILLIGRAM(S): at 11:28

## 2021-06-18 RX ADMIN — Medication 325 MILLIGRAM(S): at 05:54

## 2021-06-18 RX ADMIN — Medication 500 MILLIGRAM(S): at 11:28

## 2021-06-18 RX ADMIN — Medication 325 MILLIGRAM(S): at 17:41

## 2021-06-18 RX ADMIN — SENNA PLUS 2 TABLET(S): 8.6 TABLET ORAL at 22:15

## 2021-06-18 RX ADMIN — SODIUM CHLORIDE 75 MILLILITER(S): 9 INJECTION, SOLUTION INTRAVENOUS at 05:54

## 2021-06-18 NOTE — PROGRESS NOTE ADULT - SUBJECTIVE AND OBJECTIVE BOX
[facility] -- Jian Newman Grossman, [place], Rodolfo Cota Savella, Goodger: Office # [phone]    Follow Up:  Pre and Postop Cardiac Optimization     Subjective/Observations: Patient seen and examined. Patient awake, lethargic resting comfortably in bed. Denies complaints. IVF @50 cc/hr Tolerating O2 via nasal cannula.     REVIEW OF SYSTEMS: All review of systems is negative for eye, ENT, GI, , allergic, dermatologic, musculoskeletal and neurologic except as described above    PAST MEDICAL & SURGICAL HISTORY:  HTN (hypertension)    HLD (hyperlipidemia)    h/o Aortic aneurysm    h/o TIA (transient ischemic attack) 6 yrs ago    h/o Disc prolapse Lumbar    h/o Symptomatic Bradycardia    h/o  ? Atrial fibrillation    Hyperlipidemia    Benign essential hypertension    Aortic valve disorder    H/O unilateral nephrectomy    Aortic prosthetic valve regurgitation    Artificial pacemaker    Calculus of ureter  2/2015    h/o Lumbar laminectomy    Pacemaker  Oct 2011    s/p Aortic aneurysm repair  10/25/2011    Aortic valve replacement Oct 2011    Patent foramen ovale repair        MEDICATIONS  (STANDING):  ascorbic acid 500 milliGRAM(s) Oral daily  aspirin enteric coated 325 milliGRAM(s) Oral two times a day  ceFAZolin   IVPB 2000 milliGRAM(s) IV Intermittent every 8 hours  finasteride 5 milliGRAM(s) Oral daily  folic acid 1 milliGRAM(s) Oral daily  lactated ringers. 1000 milliLiter(s) (75 mL/Hr) IV Continuous <Continuous>  pantoprazole    Tablet 40 milliGRAM(s) Oral before breakfast  polyethylene glycol 3350 17 Gram(s) Oral at bedtime  senna 2 Tablet(s) Oral at bedtime  simvastatin 10 milliGRAM(s) Oral at bedtime    MEDICATIONS  (PRN):  acetaminophen   Tablet .. 650 milliGRAM(s) Oral every 6 hours PRN Mild Pain (1 - 3)  magnesium hydroxide Suspension 30 milliLiter(s) Oral daily PRN Constipation  meclizine 12.5 milliGRAM(s) Oral three times a day PRN Dizziness  morphine  - Injectable 2 milliGRAM(s) IV Push every 4 hours PRN Severe Pain (7 - 10)  ondansetron Injectable 4 milliGRAM(s) IV Push every 6 hours PRN Nausea and/or Vomiting  traMADol 50 milliGRAM(s) Oral every 6 hours PRN Moderate Pain (4 - 6)    Allergies    shellfish (Rash)  Sulfa druga - Rash (Rash)  sulfa drugs (Hives)    Intolerances      Vital Signs Last 24 Hrs  T(C): 36.8 (18 Jun 2021 05:13), Max: 36.8 (18 Jun 2021 05:13)  T(F): 98.2 (18 Jun 2021 05:13), Max: 98.2 (18 Jun 2021 05:13)  HR: 80 (18 Jun 2021 05:13) (60 - 80)  BP: 103/52 (18 Jun 2021 05:13) (103/52 - 164/79)  BP(mean): --  RR: 18 (18 Jun 2021 05:13) (16 - 18)  SpO2: 97% (18 Jun 2021 05:13) (97% - 100%)  I&O's Summary    17 Jun 2021 07:01  -  18 Jun 2021 07:00  --------------------------------------------------------  IN: 1425 mL / OUT: 0 mL / NET: 1425 mL          TELE: Not on telemetry   PHYSICAL EXAM:  Appearance: NAD, no distress, alert, frail   HEENT: Moist Mucous Membranes, Anicteric  Cardiovascular: Regular rate and rhythm, Normal S1 S2, No JVD, No murmurs, No rubs, gallops or clicks  Respiratory: Non-labored, Diminished at bases , No rales, No rhonchi, No wheezing.   Gastrointestinal:  Soft, Non-tender, + BS  : Hensley clear yellow urine  Skin: Warm and dry, No visible rashes or ulcers, No ecchymosis, No cyanosis Right hip with intact dsg  Musculoskeletal: No clubbing, No cyanosis, right hip[ joint swelling/tenderness  Psychiatry: Mood & affect appropriate  Lymph: right LE mild  edema.     LABS: All Labs Reviewed:                        10.1 6.02  )-----------( 189      ( 18 Jun 2021 06:43 )             30.9                         10.4   7.53  )-----------( 172      ( 17 Jun 2021 22:41 )             32.1                         10.5   6.76  )-----------( 181      ( 17 Jun 2021 06:03 )             32.1     18 Jun 2021 06:43    143    |  110    |  34     ----------------------------<  114    4.2     |  23     |  1.60   17 Jun 2021 22:41    142    |  110    |  30     ----------------------------<  100    4.1     |  20     |  1.30   17 Jun 2021 06:03    143    |  112    |  29     ----------------------------<  71     3.9     |  25     |  1.30     Ca    8.1        18 Jun 2021 06:43  Ca    8.3        17 Jun 2021 22:41  Ca    8.6        17 Jun 2021 06:03  Phos  2.7       17 Jun 2021 06:03    TPro  6.1    /  Alb  2.7    /  TBili  0.6    /  DBili  x      /  AST  27     /  ALT  17     /  AlkPhos  121    17 Jun 2021 06:03  TPro  7.3    /  Alb  3.4    /  TBili  0.5    /  DBili  x      /  AST  21     /  ALT  13     /  AlkPhos  146    15 Nigel 2021 19:53    PT/INR - ( 17 Jun 2021 06:03 )   PT: 13.6 sec;   INR: 1.17 ratio         PTT - ( 17 Jun 2021 06:03 )  PTT:28.8 sec  Creatine Kinase, Serum: 207 U/L (06-17-21 @ 06:03)

## 2021-06-18 NOTE — PROGRESS NOTE ADULT - ASSESSMENT
89 yo M with PMH of HTN, HLD, dementia, A-fib, BPH, s/p aortic aneurysm repair with AVR, PPM ([employer]) presented to ED with worsening confusion and fall, found to have R femoral neck fracture. Cardiology consulted for cardiac clearance.     R femoral neck fracture  - S/P R Hip hemiarthroplasty, tolerated procedure well from CV standpoint  - PPM interrogation 06/16/2021 showed remaining longevity 1.5 years, multiple ATR, A paced 68%, V paved 41%  - Echo from 2016, normal LVEF  - No evidence of volume overload  - pain control,  PT, hip precautions per ortho    PAF  - HR: 80 (06-18 @ 05:13) (60 - 80)  - EKG showing NSR with RBBB with signs of intermittent V-pacing    HTN  Hypertension  - BP: 103/52 (06-18-21 @ 05:13) (103/52 - 125/59)  - Not on antihypertensives   - Monitor routine hemodynamics     Hyperlipidemia  - Continue Zocor    - Monitor and replete lytes, keep K>4, Mg>2.  - All other medical needs as per primary team.  - Other cardiovascular workup will depend on clinical course.  - Will continue to follow.    Rafia Lane, MS ANP, AGACNP  Nurse Practitioner- Cardiology   Spectra #[phone]

## 2021-06-18 NOTE — PROGRESS NOTE ADULT - ASSESSMENT
A/P: Pt is a 89y Male POD 1 from R Hip Simone.  -Pain Control  -DVT PPx-- bid x 30 days  -WBAT  -PT/OT  -Posterior precautions  -Hip abduction pillow  -Encourage Incentive Spirometry  -Med Management  -Dispo Planning  -Will discuss with attending and advise if plan changes

## 2021-06-18 NOTE — OCCUPATIONAL THERAPY INITIAL EVALUATION ADULT - PERTINENT HX OF CURRENT PROBLEM, REHAB EVAL
89 yo male presented to the ED from home with son for generalized weakness and worsening confusion s/p fall at home 6/14/2021. Patient admitted with right femoral neck fx and s/p right hip hemiarthroplasty 6/17/2021. CT head 6/15/21: (-) acute hemorrhage or mass effect. 89 y/o male presented to the ED from home with son for generalized weakness and worsening confusion s/p fall at home 6/14/2021. Patient admitted with right femoral neck fx and s/p right hip hemiarthroplasty 6/17/2021. CT head 6/15/21: (-) acute hemorrhage or mass effect.

## 2021-06-18 NOTE — PHYSICAL THERAPY INITIAL EVALUATION ADULT - PERTINENT HX OF CURRENT PROBLEM, REHAB EVAL
89 yo male with h/o pacemaker and dementia presents to the ED from home with son for generalized weakness and worsening confusion s/p fall Monday morning around 3 am. At baseline patient ambulates with walker. Since then patient has been more confused than baseline and unable ambulate.

## 2021-06-18 NOTE — PROGRESS NOTE ADULT - SUBJECTIVE AND OBJECTIVE BOX
Pt seen and examined at bedside. Pt demented. No acute overnight events.   Labs:                        10.1   6.02  )-----------( 189      ( 2021 06:43 )             30.9         142  |  110<H>  |  30<H>  ----------------------------<  100<H>  4.1   |  20<L>  |  1.30    Ca    8.3<L>      2021 22:41  Phos  2.7         TPro  6.1  /  Alb  2.7<L>  /  TBili  0.6  /  DBili  x   /  AST  27  /  ALT  17  /  AlkPhos  121<H>      PT/INR - ( 2021 06:03 )   PT: 13.6 sec;   INR: 1.17 ratio         PTT - ( 2021 06:03 )  PTT:28.8 sec  Urinalysis Basic - ( 2021 22:11 )    Color: Yellow / Appearance: Clear / S.010 / pH: x  Gluc: x / Ketone: Negative  / Bili: Negative / Urobili: Negative   Blood: x / Protein: 30 mg/dL / Nitrite: Negative   Leuk Esterase: Trace / RBC: 6-10 /HPF / WBC 0-2   Sq Epi: x / Non Sq Epi: Occasional / Bacteria: Few      Vitals:  T(C): 36.8 (21 @ 05:13), Max: 36.8 (21 @ 05:13)  HR: 80 (21 @ 05:13) (60 - 80)  BP: 103/52 (21 @ 05:13) (103/52 - 164/79)  RR: 18 (21 @ 05:13) (16 - 18)  SpO2: 97% (21 @ 05:13) (97% - 100%)    Physical Exam:  Gen: NAD, resting comfortably    RLE:  Dressing clean, dry, intact  +Hip abduction pillow  +EHL/FHL/TA/GS  SILT L2-S1  +DP/PT Pulses  Compartments soft and compressible  No calf TTP B/L

## 2021-06-18 NOTE — PROGRESS NOTE ADULT - ASSESSMENT
89 yr old male w dementia, PPM, aortic aneurysm repair, ?afib  adm w right femoral neck fracture  acute kidney injury- most likely secondary to dehydration, indices improving and will cont ivf  cardio clearance noted and appreciated  There are no medical contraindications to proposed procedure    advance care planning and advance directives discussed with pt's son/HCP, ambika wade [phone]- requesting DNR, will order

## 2021-06-18 NOTE — OCCUPATIONAL THERAPY INITIAL EVALUATION ADULT - RANGE OF MOTION EXAMINATION, UPPER EXTREMITY
Fine motor skills: WFL's-impaired to due impaired cognition./bilateral UE Active Assistive ROM was WFL  (within functional limits)

## 2021-06-18 NOTE — OCCUPATIONAL THERAPY INITIAL EVALUATION ADULT - GENERAL OBSERVATIONS, REHAB EVAL
Patient found supine with +IV right UE, +SCD, +bandage right hip, +abduction pillow, supplemental oxygen. Patient appears confused and oriented to self only.

## 2021-06-18 NOTE — PROGRESS NOTE ADULT - SUBJECTIVE AND OBJECTIVE BOX
Patient is a 89y old  Male who presents with a chief complaint of Acute Altered in mental status (2021 07:45)       HPI:  Chart and labs reviewed.  FAM GRANADOS is a 89y Male presented to the ED from home with son for generalized weakness and worsening confusion and a fall on Monday morning around 3 am. At baseline patient ambulates with walker. Son explains they have cameras in the house and saw him trip and fall Monday morning. Since then patient has been more confused than baseline and unable ambulate. Patient unable to provide history due to dementia. As per son, no sob, vomiting or diarrhea. no blood thinners. Patient currently takes no medications daily.     Renal is being consulted for elevated BUN/Cr. Patient is a very poor historian. No other specific h/o. Unknown baseline renal function     Follow up acute on CKD Stage 3  No acute events noted       (15 Nigel 2021 22:44)       PAST MEDICAL & SURGICAL HISTORY:  HTN (hypertension)    HLD (hyperlipidemia)    h/o Aortic aneurysm    h/o TIA (transient ischemic attack) 6 yrs ago    h/o Disc prolapse Lumbar    h/o Symptomatic Bradycardia    h/o  ? Atrial fibrillation    Hyperlipidemia    Benign essential hypertension    Aortic valve disorder    H/O unilateral nephrectomy    Aortic prosthetic valve regurgitation    Artificial pacemaker    Calculus of ureter  2015    h/o Lumbar laminectomy    Pacemaker  Oct 2011    s/p Aortic aneurysm repair  10/25/2011    Aortic valve replacement Oct 2011    Patent foramen ovale repair         FAMILY HISTORY:  No pertinent family history in first degree relatives    [place]    Social History:Non smoker    MEDICATIONS  (STANDING):  ascorbic acid 500 milliGRAM(s) Oral daily  finasteride 5 milliGRAM(s) Oral daily  folic acid 1 milliGRAM(s) Oral daily  lactated ringers. 1000 milliLiter(s) (75 mL/Hr) IV Continuous <Continuous>  simvastatin 10 milliGRAM(s) Oral at bedtime    MEDICATIONS  (PRN):  acetaminophen   Tablet .. 650 milliGRAM(s) Oral every 6 hours PRN Mild Pain (1 - 3)  meclizine 12.5 milliGRAM(s) Oral three times a day PRN Dizziness  morphine  - Injectable 2 milliGRAM(s) IV Push every 4 hours PRN Severe Pain (7 - 10)      Allergies    shellfish (Rash)  Sulfa drugs - Rash (Rash)  sulfa drugs (Hives)    Intolerances         REVIEW OF SYSTEMS: NA. Confused    Vital Signs Last 24 Hrs  T(C): 36.7 (2021 13:16), Max: 36.8 (2021 05:13)  T(F): 98 (2021 13:16), Max: 98.2 (2021 05:13)  HR: 65 (2021 13:16) (60 - 80)  BP: 134/64 (2021 13:16) (103/52 - 164/79)  BP(mean): --  RR: 19 (2021 13:16) (16 - 19)  SpO2: 98% (2021 13:16) (97% - 100%)    PHYSICAL EXAM:    GENERAL: No distress  HEAD:  Atraumatic, Normocephalic  NECK: Supple  NERVOUS SYSTEM: Confused  CHEST/LUNG: Clear to percussion bilaterally; No rales, rhonchi, wheezing, or rubs  HEART: Regular rate and rhythm; No murmurs, rubs, or gallops  ABDOMEN: Soft, Nontender, Nondistended; Bowel sounds present  EXTREMITIES:  No Edema  SKIN: No rashes No obvious ecchymosis      LABS:                        10.1   6.02  )-----------( 189      ( 2021 06:43 )             30.9     06-18    143  |  110<H>  |  34<H>  ----------------------------<  114<H>  4.2   |  23  |  1.60<H>    Ca    8.1<L>      2021 06:43  Phos  2.7     06-17    TPro  6.1  /  Alb  2.7<L>  /  TBili  0.6  /  DBili  x   /  AST  27  /  ALT  17  /  AlkPhos  121<H>  06-17    PT/INR - ( 2021 06:03 )   PT: 13.6 sec;   INR: 1.17 ratio         PTT - ( 2021 06:03 )  PTT:28.8 sec  Urinalysis Basic - ( 2021 22:11 )    Color: Yellow / Appearance: Clear / S.010 / pH: x  Gluc: x / Ketone: Negative  / Bili: Negative / Urobili: Negative   Blood: x / Protein: 30 mg/dL / Nitrite: Negative   Leuk Esterase: Trace / RBC: 6-10 /HPF / WBC 0-2   Sq Epi: x / Non Sq Epi: Occasional / Bacteria: Few

## 2021-06-18 NOTE — PROGRESS NOTE ADULT - ASSESSMENT
RAMIRO/CKD  Fall/R femoral neck fracture  S/P R Hip hemiarthroplasty  HTN  Dementia    -Unknown baseline renal function. +RAMIRO component. Renal function is improving now with hydration suggest pre-renal component  -Check CPK - normal range  -Urine studies - reviewed   -Renal indices slightly worsen today; near presumed baseline? Stable electrolytes  -S/p IVF    Thank you

## 2021-06-18 NOTE — OCCUPATIONAL THERAPY INITIAL EVALUATION ADULT - LEVEL OF INDEPENDENCE: TOILET, REHAB EVAL
due to decreased strength/impaired balance/dependent (less than 25% patients effort)/unable to perform

## 2021-06-18 NOTE — PROGRESS NOTE ADULT - SUBJECTIVE AND OBJECTIVE BOX
Staff Member Patient is a 89y old  Male who presents with a chief complaint of Acute Altered in mental status (2021 15:05)      INTERVAL /OVERNIGHT EVENTS: still confused    MEDICATIONS  (STANDING):  ascorbic acid 500 milliGRAM(s) Oral daily  aspirin enteric coated 325 milliGRAM(s) Oral two times a day  finasteride 5 milliGRAM(s) Oral daily  folic acid 1 milliGRAM(s) Oral daily  pantoprazole    Tablet 40 milliGRAM(s) Oral before breakfast  polyethylene glycol 3350 17 Gram(s) Oral at bedtime  senna 2 Tablet(s) Oral at bedtime  simvastatin 10 milliGRAM(s) Oral at bedtime    MEDICATIONS  (PRN):  acetaminophen   Tablet .. 650 milliGRAM(s) Oral every 6 hours PRN Mild Pain (1 - 3)  magnesium hydroxide Suspension 30 milliLiter(s) Oral daily PRN Constipation  meclizine 12.5 milliGRAM(s) Oral three times a day PRN Dizziness  morphine  - Injectable 2 milliGRAM(s) IV Push every 4 hours PRN Severe Pain (7 - 10)  ondansetron Injectable 4 milliGRAM(s) IV Push every 6 hours PRN Nausea and/or Vomiting  traMADol 50 milliGRAM(s) Oral every 6 hours PRN Moderate Pain (4 - 6)      Allergies    shellfish (Rash)  Sulfa druga - Rash (Rash)  sulfa drugs (Hives)    Intolerances        REVIEW OF SYSTEMS:  denies    Vital Signs Last 24 Hrs  T(C): 36.7 (2021 13:16), Max: 36.8 (2021 05:13)  T(F): 98 (2021 13:16), Max: 98.2 (2021 05:13)  HR: 65 (2021 13:16) (60 - 80)  BP: 134/64 (2021 13:16) (103/52 - 164/79)  BP(mean): --  RR: 19 (2021 13:16) (16 - 19)  SpO2: 98% (2021 13:16) (97% - 100%)    PHYSICAL EXAM:  GENERAL: NAD, well-groomed, well-developed  HEAD:  Atraumatic, Normocephalic  EYES: EOMI, PERRLA, conjunctiva and sclera clear  ENMT: No tonsillar erythema, exudates, or enlargement; Moist mucous membranes, Good dentition, No lesions  NECK: Supple, No JVD, Normal thyroid  NERVOUS SYSTEM:  Alert & awake; Motor Strength 5/5 B/L upper and lower extremities; DTRs 2+ intact and symmetric  CHEST/LUNG: Clear to auscultation bilaterally; No rales, rhonchi, wheezing, or rubs  HEART: Regular rate and rhythm; No murmurs, rubs, or gallops  ABDOMEN: Soft, Nontender, Nondistended; Bowel sounds present  EXTREMITIES:  2+ Peripheral Pulses, No clubbing, cyanosis, or edema  LYMPH: No lymphadenopathy noted  SKIN: No rashes or lesions    LABS:                        10.1   6.02  )-----------( 189      ( 2021 06:43 )             30.9     2021 06:43    143    |  110    |  34     ----------------------------<  114    4.2     |  23     |  1.60     Ca    8.1        2021 06:43      PT/INR - ( 2021 06:03 )   PT: 13.6 sec;   INR: 1.17 ratio         PTT - ( 2021 06:03 )  PTT:28.8 sec  Urinalysis Basic - ( 2021 22:11 )    Color: Yellow / Appearance: Clear / S.010 / pH: x  Gluc: x / Ketone: Negative  / Bili: Negative / Urobili: Negative   Blood: x / Protein: 30 mg/dL / Nitrite: Negative   Leuk Esterase: Trace / RBC: 6-10 /HPF / WBC 0-2   Sq Epi: x / Non Sq Epi: Occasional / Bacteria: Few      CAPILLARY BLOOD GLUCOSE          RADIOLOGY & ADDITIONAL TESTS:    Notes Reviewed:  [x ] YES  [ ] NO    Care Discussed with Consultants/Other Providers [x ] YES  [ ] NO

## 2021-06-18 NOTE — OCCUPATIONAL THERAPY INITIAL EVALUATION ADULT - ADDITIONAL COMMENTS
Patient lives alone in a house. Patient has a HHA 7 hours daily 7 days/week. Prior to admission patient ambulated using a walker and assisted with ADL's. Patient is an unreliable historian, info obtained from EMR. Patient lives alone in a house. Patient has a HHA 7 hours daily 7 days/week. Prior to admission patient ambulated using a walker and assisted with ADL's. Patient performed sit to stand with max assist x 2 using RW for support though unable to ambulate due to LE weakness. Patient requires assistance with ADL's and transfers due to THP's Right LE, impaired cognition, decreased strength, decreased endurance and impaired sitting/standing balance.

## 2021-06-18 NOTE — PROGRESS NOTE ADULT - SUBJECTIVE AND OBJECTIVE BOX
89M POD1 s/p right hip hemiarthroplasty. Patient remains unable to follow commands. Stable overnight.    On exam, right hip dressing clean, dry, intact LE's positioned in abduction pillow. Able to spontaneously move ankle/foot. Warm toes.    WBC - 6.02  H&H 10.1/30.9    A/P: POD1 s/p right hip hemiarthroplasty.  RAMIRO - appreciate renal consultation; continue IVF  Reg diet  PT/OT - WBAT/Posterior hip precautions  ABX proph - x2 doses  DVT Proph - [id number] BID x30days  Dispo - pending PT/OT status    Iker Scott MD  Orthopaedics

## 2021-06-19 DIAGNOSIS — R33.9 RETENTION OF URINE, UNSPECIFIED: ICD-10-CM

## 2021-06-19 LAB
ANION GAP SERPL CALC-SCNC: 10 MMOL/L — SIGNIFICANT CHANGE UP (ref 5–17)
BUN SERPL-MCNC: 34 MG/DL — HIGH (ref 7–23)
CALCIUM SERPL-MCNC: 8.3 MG/DL — LOW (ref 8.5–10.1)
CHLORIDE SERPL-SCNC: 111 MMOL/L — HIGH (ref 96–108)
CO2 SERPL-SCNC: 25 MMOL/L — SIGNIFICANT CHANGE UP (ref 22–31)
CREAT SERPL-MCNC: 1.6 MG/DL — HIGH (ref 0.5–1.3)
GLUCOSE SERPL-MCNC: 98 MG/DL — SIGNIFICANT CHANGE UP (ref 70–99)
HCT VFR BLD CALC: 29.2 % — LOW (ref 39–50)
HGB BLD-MCNC: 9.6 G/DL — LOW (ref 13–17)
MAGNESIUM SERPL-MCNC: 2.4 MG/DL — SIGNIFICANT CHANGE UP (ref 1.6–2.6)
MCHC RBC-ENTMCNC: 30.5 PG — SIGNIFICANT CHANGE UP (ref 27–34)
MCHC RBC-ENTMCNC: 32.9 GM/DL — SIGNIFICANT CHANGE UP (ref 32–36)
MCV RBC AUTO: 92.7 FL — SIGNIFICANT CHANGE UP (ref 80–100)
NRBC # BLD: 0 /100 WBCS — SIGNIFICANT CHANGE UP (ref 0–0)
PHOSPHATE SERPL-MCNC: 2.8 MG/DL — SIGNIFICANT CHANGE UP (ref 2.5–4.5)
PLATELET # BLD AUTO: 182 K/UL — SIGNIFICANT CHANGE UP (ref 150–400)
POTASSIUM SERPL-MCNC: 3.7 MMOL/L — SIGNIFICANT CHANGE UP (ref 3.5–5.3)
POTASSIUM SERPL-SCNC: 3.7 MMOL/L — SIGNIFICANT CHANGE UP (ref 3.5–5.3)
RBC # BLD: 3.15 M/UL — LOW (ref 4.2–5.8)
RBC # FLD: 13.7 % — SIGNIFICANT CHANGE UP (ref 10.3–14.5)
SODIUM SERPL-SCNC: 146 MMOL/L — HIGH (ref 135–145)
WBC # BLD: 8.15 K/UL — SIGNIFICANT CHANGE UP (ref 3.8–10.5)
WBC # FLD AUTO: 8.15 K/UL — SIGNIFICANT CHANGE UP (ref 3.8–10.5)

## 2021-06-19 PROCEDURE — 99232 SBSQ HOSP IP/OBS MODERATE 35: CPT

## 2021-06-19 RX ORDER — DOXAZOSIN MESYLATE 4 MG
1 TABLET ORAL AT BEDTIME
Refills: 0 | Status: DISCONTINUED | OUTPATIENT
Start: 2021-06-19 | End: 2021-06-21

## 2021-06-19 RX ORDER — ENOXAPARIN SODIUM 100 MG/ML
40 INJECTION SUBCUTANEOUS EVERY 24 HOURS
Refills: 0 | Status: DISCONTINUED | OUTPATIENT
Start: 2021-06-19 | End: 2021-06-23

## 2021-06-19 RX ORDER — SODIUM CHLORIDE 9 MG/ML
1000 INJECTION, SOLUTION INTRAVENOUS
Refills: 0 | Status: DISCONTINUED | OUTPATIENT
Start: 2021-06-19 | End: 2021-06-21

## 2021-06-19 RX ADMIN — Medication 1 MILLIGRAM(S): at 22:38

## 2021-06-19 RX ADMIN — SIMVASTATIN 10 MILLIGRAM(S): 20 TABLET, FILM COATED ORAL at 22:38

## 2021-06-19 RX ADMIN — Medication 500 MILLIGRAM(S): at 11:31

## 2021-06-19 RX ADMIN — PANTOPRAZOLE SODIUM 40 MILLIGRAM(S): 20 TABLET, DELAYED RELEASE ORAL at 05:11

## 2021-06-19 RX ADMIN — SENNA PLUS 2 TABLET(S): 8.6 TABLET ORAL at 22:38

## 2021-06-19 RX ADMIN — POLYETHYLENE GLYCOL 3350 17 GRAM(S): 17 POWDER, FOR SOLUTION ORAL at 22:38

## 2021-06-19 RX ADMIN — ENOXAPARIN SODIUM 40 MILLIGRAM(S): 100 INJECTION SUBCUTANEOUS at 11:33

## 2021-06-19 RX ADMIN — Medication 1 MILLIGRAM(S): at 11:31

## 2021-06-19 RX ADMIN — Medication 325 MILLIGRAM(S): at 05:11

## 2021-06-19 RX ADMIN — SODIUM CHLORIDE 50 MILLILITER(S): 9 INJECTION, SOLUTION INTRAVENOUS at 09:35

## 2021-06-19 RX ADMIN — FINASTERIDE 5 MILLIGRAM(S): 5 TABLET, FILM COATED ORAL at 11:31

## 2021-06-19 NOTE — PROGRESS NOTE ADULT - SUBJECTIVE AND OBJECTIVE BOX
[facility] - Jian Newman, Katrina, Brandy, Beata, Hunter Reynolds  Office Number:  [phone]    Patient resting comfortably in bed in NAD.  unable to obtain interval history given mental status  bladder scan with 800 cc    ROS: negative unless otherwise mentioned.    Telemetry:      MEDICATIONS  (STANDING):  ascorbic acid 500 milliGRAM(s) Oral daily  dextrose 5%. 1000 milliLiter(s) (50 mL/Hr) IV Continuous <Continuous>  doxazosin 1 milliGRAM(s) Oral at bedtime  enoxaparin Injectable 40 milliGRAM(s) SubCutaneous every 24 hours  finasteride 5 milliGRAM(s) Oral daily  folic acid 1 milliGRAM(s) Oral daily  pantoprazole    Tablet 40 milliGRAM(s) Oral before breakfast  polyethylene glycol 3350 17 Gram(s) Oral at bedtime  senna 2 Tablet(s) Oral at bedtime  simvastatin 10 milliGRAM(s) Oral at bedtime    MEDICATIONS  (PRN):  acetaminophen   Tablet .. 650 milliGRAM(s) Oral every 6 hours PRN Mild Pain (1 - 3)  bisacodyl Suppository 10 milliGRAM(s) Rectal once PRN Constipation  magnesium hydroxide Suspension 30 milliLiter(s) Oral daily PRN Constipation  meclizine 12.5 milliGRAM(s) Oral three times a day PRN Dizziness  morphine  - Injectable 2 milliGRAM(s) IV Push every 4 hours PRN Severe Pain (7 - 10)  ondansetron Injectable 4 milliGRAM(s) IV Push every 6 hours PRN Nausea and/or Vomiting  traMADol 50 milliGRAM(s) Oral every 6 hours PRN Moderate Pain (4 - 6)      Allergies    shellfish (Rash)  Sulfa druga - Rash (Rash)  sulfa drugs (Hives)    Intolerances        Vital Signs Last 24 Hrs  T(C): 36.6 (19 Jun 2021 12:37), Max: 37.3 (19 Jun 2021 05:08)  T(F): 97.8 (19 Jun 2021 12:37), Max: 99.2 (19 Jun 2021 05:08)  HR: 70 (19 Jun 2021 12:37) (60 - 70)  BP: 92/56 (19 Jun 2021 12:37) (92/56 - 136/63)  BP(mean): --  RR: 16 (19 Jun 2021 12:37) (16 - 18)  SpO2: 92% (19 Jun 2021 12:37) (92% - 94%)    I&O's Summary    18 Jun 2021 07:01  -  19 Jun 2021 07:00  --------------------------------------------------------  IN: 450 mL / OUT: 0 mL / NET: 450 mL        ON EXAM:    Appearance: NAD, no distress, alert, frail   HEENT: Moist Mucous Membranes, Anicteric  Cardiovascular: Regular rate and rhythm, Normal S1 S2, No JVD, No murmurs, No rubs, gallops or clicks  Respiratory: Non-labored, Diminished at bases , No rales, No rhonchi, No wheezing.   Gastrointestinal:  Soft, Non-tender, + BS  : Hensley clear yellow urine  Skin: Warm and dry, No visible rashes or ulcers, No ecchymosis, No cyanosis Right hip with intact dsg  Musculoskeletal: No clubbing, No cyanosis, right hip[ joint swelling/tenderness  Psychiatry: confused  Lymph: right LE mild  edema.     LABS: All Labs Reviewed:                        9.6    8.15  )-----------( 182      ( 19 Jun 2021 06:07 )             29.2                         10.1   6.02  )-----------( 189      ( 18 Jun 2021 06:43 )             30.9                         10.4   7.53  )-----------( 172      ( 17 Jun 2021 22:41 )             32.1     19 Jun 2021 06:06    146    |  111    |  34     ----------------------------<  98     3.7     |  25     |  1.60   18 Jun 2021 06:43    143    |  110    |  34     ----------------------------<  114    4.2     |  23     |  1.60   17 Jun 2021 22:41    142    |  110    |  30     ----------------------------<  100    4.1     |  20     |  1.30     Ca    8.3        19 Jun 2021 06:06  Ca    8.1        18 Jun 2021 06:43  Ca    8.3        17 Jun 2021 22:41  Phos  2.8       19 Jun 2021 06:06  Phos  2.7       17 Jun 2021 06:03  Mg     2.4       19 Jun 2021 06:06    TPro  6.1    /  Alb  2.7    /  TBili  0.6    /  DBili  x      /  AST  27     /  ALT  17     /  AlkPhos  121    17 Jun 2021 06:03          Blood Culture: Organism --  Gram Stain Blood -- Gram Stain --  Specimen Source .Urine Clean Catch (Midstream)  Culture-Blood --

## 2021-06-19 NOTE — PROGRESS NOTE ADULT - SUBJECTIVE AND OBJECTIVE BOX
Patient seen and examined. Pain controlled. No acute events overnight    MEDICATIONS  (STANDING):  ascorbic acid 500 milliGRAM(s) Oral daily  aspirin enteric coated 325 milliGRAM(s) Oral two times a day  dextrose 5%. 1000 milliLiter(s) IV Continuous <Continuous>  finasteride 5 milliGRAM(s) Oral daily  folic acid 1 milliGRAM(s) Oral daily  pantoprazole    Tablet 40 milliGRAM(s) Oral before breakfast  polyethylene glycol 3350 17 Gram(s) Oral at bedtime  senna 2 Tablet(s) Oral at bedtime  simvastatin 10 milliGRAM(s) Oral at bedtime    Allergies    shellfish (Rash)  Sulfa druga - Rash (Rash)  sulfa drugs (Hives)    Intolerances                            9.6    8.15  )-----------( 182      ( 19 Jun 2021 06:07 )             29.2     19 Jun 2021 06:06    146    |  111    |  34     ----------------------------<  98     3.7     |  25     |  1.60     Ca    8.3        19 Jun 2021 06:06  Phos  2.8       19 Jun 2021 06:06  Mg     2.4       19 Jun 2021 06:06        Vital Signs Last 24 Hrs  T(C): 37.3 (06-19-21 @ 05:08), Max: 37.3 (06-19-21 @ 05:08)  T(F): 99.2 (06-19-21 @ 05:08), Max: 99.2 (06-19-21 @ 05:08)  HR: 61 (06-19-21 @ 05:08) (60 - 65)  BP: 136/63 (06-19-21 @ 05:08) (119/62 - 136/63)  BP(mean): --  RR: 16 (06-19-21 @ 05:08) (16 - 19)  SpO2: 92% (06-19-21 @ 05:08) (92% - 98%)    Physical Exam  Gen: NAD  RLE:   Dressing clean/dry/intact  abduction pillow in palce  + EHL/FHL/TA/GSC   SILT L2-S1   DP Intact   No calf ttp  Compartments soft    A/P:  89y Male sp R hip maxime POD# 2  Pain control  DVT ppx, aspirin 325 daily  PT/WBAT/Posterior hip precautions/abduction pillow/OOB  FU labs  Medical management appreciated  Incentive spirometry  Dispo planning

## 2021-06-19 NOTE — PROGRESS NOTE ADULT - ASSESSMENT
89 yo M with PMH of HTN, HLD, dementia, A-fib, BPH, s/p aortic aneurysm repair with AVR, PPM ([employer]) presented to ED with worsening confusion and fall, found to have R femoral neck fracture. Cardiology consulted for cardiac clearance.     R femoral neck fracture  - S/P R Hip hemiarthroplasty, tolerated procedure well from CV standpoint  - PPM interrogation 06/16/2021 showed remaining longevity 1.5 years, multiple ATR, A paced 68%, V paved 41%  - Echo from 2016, normal LVEF  - No evidence of volume overload  - pain control,  PT, hip precautions per ortho    PAF  - HR: 70   - EKG showing NSR with RBBB with signs of intermittent V-pacing    Hypertension  - BP has been labile  - Not on antihypertensives and would keep off  - encourage po intake, as he has hiren and hypernatremia  - agree with d5w for today  - Monitor routine hemodynamics     Hyperlipidemia  - Continue Zocor    - Monitor and replete lytes, keep K>4, Mg>2.  - All other medical needs as per primary team.  - Other cardiovascular workup will depend on clinical course.  - Will continue to follow.

## 2021-06-19 NOTE — PROGRESS NOTE ADULT - SUBJECTIVE AND OBJECTIVE BOX
Patient is a 89y old  Male who presents with a chief complaint of Acute Altered in mental status (16 Jun 2021 07:45)       HPI:  Chart and labs reviewed.  FAM GRANADOS is a 89y Male presented to the ED from home with son for generalized weakness and worsening confusion and a fall on Monday morning around 3 am. At baseline patient ambulates with walker. Son explains they have cameras in the house and saw him trip and fall Monday morning. Since then patient has been more confused than baseline and unable ambulate. Patient unable to provide history due to dementia. As per son, no sob, vomiting or diarrhea. no blood thinners. Patient currently takes no medications daily.     Renal is being consulted for elevated BUN/Cr. Patient is a very poor historian. No other specific h/o. Unknown baseline renal function     Follow up acute on CKD Stage 3  No acute events noted       (15 Nigel 2021 22:44)       PAST MEDICAL & SURGICAL HISTORY:  HTN (hypertension)    HLD (hyperlipidemia)    h/o Aortic aneurysm    h/o TIA (transient ischemic attack) 6 yrs ago    h/o Disc prolapse Lumbar    h/o Symptomatic Bradycardia    h/o  ? Atrial fibrillation    Hyperlipidemia    Benign essential hypertension    Aortic valve disorder    H/O unilateral nephrectomy    Aortic prosthetic valve regurgitation    Artificial pacemaker    Calculus of ureter  2/2015    h/o Lumbar laminectomy    Pacemaker  Oct 2011    s/p Aortic aneurysm repair  10/25/2011    Aortic valve replacement Oct 2011    Patent foramen ovale repair         FAMILY HISTORY:  No pertinent family history in first degree relatives    [place]    Social History:Non smoker    MEDICATIONS  (STANDING):  ascorbic acid 500 milliGRAM(s) Oral daily  finasteride 5 milliGRAM(s) Oral daily  folic acid 1 milliGRAM(s) Oral daily  lactated ringers. 1000 milliLiter(s) (75 mL/Hr) IV Continuous <Continuous>  simvastatin 10 milliGRAM(s) Oral at bedtime    MEDICATIONS  (PRN):  acetaminophen   Tablet .. 650 milliGRAM(s) Oral every 6 hours PRN Mild Pain (1 - 3)  meclizine 12.5 milliGRAM(s) Oral three times a day PRN Dizziness  morphine  - Injectable 2 milliGRAM(s) IV Push every 4 hours PRN Severe Pain (7 - 10)      Allergies    shellfish (Rash)  Sulfa drugs - Rash (Rash)  sulfa drugs (Hives)    Intolerances         REVIEW OF SYSTEMS: NA. Confused    Vital Signs Last 24 Hrs  T(C): 37.3 (19 Jun 2021 05:08), Max: 37.3 (19 Jun 2021 05:08)  T(F): 99.2 (19 Jun 2021 05:08), Max: 99.2 (19 Jun 2021 05:08)  HR: 61 (19 Jun 2021 05:08) (60 - 65)  BP: 136/63 (19 Jun 2021 05:08) (119/62 - 136/63)  BP(mean): --  RR: 16 (19 Jun 2021 05:08) (16 - 19)  SpO2: 92% (19 Jun 2021 05:08) (92% - 98%)    PHYSICAL EXAM:    GENERAL: No distress  HEAD:  Atraumatic, Normocephalic  NECK: Supple  NERVOUS SYSTEM: Confused  CHEST/LUNG: Clear to percussion bilaterally; No rales, rhonchi, wheezing, or rubs  HEART: Regular rate and rhythm; No murmurs, rubs, or gallops  ABDOMEN: Soft, Nontender, Nondistended; Bowel sounds present  EXTREMITIES:  No Edema  SKIN: No rashes No obvious ecchymosis      LABS:                                           9.6    8.15  )-----------( 182      ( 19 Jun 2021 06:07 )             29.2     06-19    146<H>  |  111<H>  |  34<H>  ----------------------------<  98  3.7   |  25  |  1.60<H>    Ca    8.3<L>      19 Jun 2021 06:06  Phos  2.8     06-19  Mg     2.4     06-19

## 2021-06-19 NOTE — PROGRESS NOTE ADULT - ASSESSMENT
RAMIRO/CKD  Fall/R femoral neck fracture  S/P R Hip hemiarthroplasty  HTN  Dementia  Hypernatremia    -Unknown baseline renal function; likely Stage 3 given age. +RAMIRO component.   -Checked CPK - normal range  -Urine studies - reviewed   -Renal indices stabilized; will continue to monitor. Mild hypernatremia noted; poor intake  -D5W x 1 day    Thank you

## 2021-06-19 NOTE — PROGRESS NOTE ADULT - SUBJECTIVE AND OBJECTIVE BOX
89M POD2 s/p right hip hemiarthroplasty. Patient sleeping at encounter. Stable overnight.    On exam, right hip dressing clean, dry, intact LE's positioned in abduction pillow. Able to spontaneously move ankle/foot. Warm toes.    WBC - 6.02 --> 8.15  H&H 10.1/30.9 --> 9.6/29.2    A/P: POD2 s/p right hip hemiarthroplasty.  RAMIRO - appreciate renal consultation; continue IVF  Urinary retention - bean placed; Urology care appreciated  Reg diet  PT/OT - WBAT/Posterior hip precautions  DVT Proph - [id number] BID x30days  Dispo - pending PT/OT status - likely [facility] Scott MD  Orthopaedics

## 2021-06-19 NOTE — PROGRESS NOTE ADULT - SUBJECTIVE AND OBJECTIVE BOX
Patient is a 89y old  Male who presents with a chief complaint of Acute Altered in mental status (19 Jun 2021 14:30)      INTERVAL /OVERNIGHT EVENTS: resting comfortably, now in retention    MEDICATIONS  (STANDING):  ascorbic acid 500 milliGRAM(s) Oral daily  dextrose 5%. 1000 milliLiter(s) (50 mL/Hr) IV Continuous <Continuous>  doxazosin 1 milliGRAM(s) Oral at bedtime  enoxaparin Injectable 40 milliGRAM(s) SubCutaneous every 24 hours  finasteride 5 milliGRAM(s) Oral daily  folic acid 1 milliGRAM(s) Oral daily  pantoprazole    Tablet 40 milliGRAM(s) Oral before breakfast  polyethylene glycol 3350 17 Gram(s) Oral at bedtime  senna 2 Tablet(s) Oral at bedtime  simvastatin 10 milliGRAM(s) Oral at bedtime    MEDICATIONS  (PRN):  acetaminophen   Tablet .. 650 milliGRAM(s) Oral every 6 hours PRN Mild Pain (1 - 3)  bisacodyl Suppository 10 milliGRAM(s) Rectal once PRN Constipation  magnesium hydroxide Suspension 30 milliLiter(s) Oral daily PRN Constipation  meclizine 12.5 milliGRAM(s) Oral three times a day PRN Dizziness  morphine  - Injectable 2 milliGRAM(s) IV Push every 4 hours PRN Severe Pain (7 - 10)  ondansetron Injectable 4 milliGRAM(s) IV Push every 6 hours PRN Nausea and/or Vomiting  traMADol 50 milliGRAM(s) Oral every 6 hours PRN Moderate Pain (4 - 6)      Allergies    shellfish (Rash)  Sulfa druga - Rash (Rash)  sulfa drugs (Hives)    Intolerances        REVIEW OF SYSTEMS:  denies    Vital Signs Last 24 Hrs  T(C): 36.6 (19 Jun 2021 12:37), Max: 37.3 (19 Jun 2021 05:08)  T(F): 97.8 (19 Jun 2021 12:37), Max: 99.2 (19 Jun 2021 05:08)  HR: 70 (19 Jun 2021 12:37) (60 - 70)  BP: 92/56 (19 Jun 2021 12:37) (92/56 - 136/63)  BP(mean): --  RR: 16 (19 Jun 2021 12:37) (16 - 18)  SpO2: 92% (19 Jun 2021 12:37) (92% - 94%)    PHYSICAL EXAM:  GENERAL: NAD, well-groomed, well-developed  HEAD:  Atraumatic, Normocephalic  EYES: EOMI, PERRLA, conjunctiva and sclera clear  ENMT: No tonsillar erythema, exudates, or enlargement; Moist mucous membranes, Good dentition, No lesions  NECK: Supple, No JVD, Normal thyroid  NERVOUS SYSTEM: ; Motor Strength 5/5 B/L upper and lower extremities; DTRs 2+ intact and symmetric  CHEST/LUNG: Clear to auscultation bilaterally; No rales, rhonchi, wheezing, or rubs  HEART: Regular rate and rhythm; No murmurs, rubs, or gallops  ABDOMEN: Soft, Nontender, Nondistended; Bowel sounds present  EXTREMITIES:  2+ Peripheral Pulses, No clubbing, cyanosis, or edema  LYMPH: No lymphadenopathy noted  SKIN: No rashes or lesions    LABS:                        9.6    8.15  )-----------( 182      ( 19 Jun 2021 06:07 )             29.2     19 Jun 2021 06:06    146    |  111    |  34     ----------------------------<  98     3.7     |  25     |  1.60     Ca    8.3        19 Jun 2021 06:06  Phos  2.8       19 Jun 2021 06:06  Mg     2.4       19 Jun 2021 06:06          CAPILLARY BLOOD GLUCOSE          RADIOLOGY & ADDITIONAL TESTS:    Notes Reviewed:  [ x] YES  [ ] NO    Care Discussed with Consultants/Other Providers [x ] YES  [ ] NO

## 2021-06-19 NOTE — PROGRESS NOTE ADULT - ASSESSMENT
89 yr old male w dementia, PPM, aortic aneurysm repair, ?afib  adm w right femoral neck fracture  acute kidney injury- most likely secondary to dehydration, indices improving and will cont ivf  cardio clearance noted and appreciated  There are no medical contraindications to proposed procedure    advance care planning and advance directives discussed with pt's son/HCP, ambika wade [phone]- requesting DNR, will order

## 2021-06-20 LAB
ANION GAP SERPL CALC-SCNC: 4 MMOL/L — LOW (ref 5–17)
BUN SERPL-MCNC: 35 MG/DL — HIGH (ref 7–23)
CALCIUM SERPL-MCNC: 8.3 MG/DL — LOW (ref 8.5–10.1)
CHLORIDE SERPL-SCNC: 111 MMOL/L — HIGH (ref 96–108)
CO2 SERPL-SCNC: 28 MMOL/L — SIGNIFICANT CHANGE UP (ref 22–31)
CREAT SERPL-MCNC: 1.4 MG/DL — HIGH (ref 0.5–1.3)
GLUCOSE SERPL-MCNC: 119 MG/DL — HIGH (ref 70–99)
HCT VFR BLD CALC: 27.9 % — LOW (ref 39–50)
HGB BLD-MCNC: 8.9 G/DL — LOW (ref 13–17)
MCHC RBC-ENTMCNC: 29.6 PG — SIGNIFICANT CHANGE UP (ref 27–34)
MCHC RBC-ENTMCNC: 31.9 GM/DL — LOW (ref 32–36)
MCV RBC AUTO: 92.7 FL — SIGNIFICANT CHANGE UP (ref 80–100)
NRBC # BLD: 0 /100 WBCS — SIGNIFICANT CHANGE UP (ref 0–0)
PLATELET # BLD AUTO: 177 K/UL — SIGNIFICANT CHANGE UP (ref 150–400)
POTASSIUM SERPL-MCNC: 3.8 MMOL/L — SIGNIFICANT CHANGE UP (ref 3.5–5.3)
POTASSIUM SERPL-SCNC: 3.8 MMOL/L — SIGNIFICANT CHANGE UP (ref 3.5–5.3)
RBC # BLD: 3.01 M/UL — LOW (ref 4.2–5.8)
RBC # FLD: 13.8 % — SIGNIFICANT CHANGE UP (ref 10.3–14.5)
SODIUM SERPL-SCNC: 143 MMOL/L — SIGNIFICANT CHANGE UP (ref 135–145)
WBC # BLD: 6.49 K/UL — SIGNIFICANT CHANGE UP (ref 3.8–10.5)
WBC # FLD AUTO: 6.49 K/UL — SIGNIFICANT CHANGE UP (ref 3.8–10.5)

## 2021-06-20 PROCEDURE — 99232 SBSQ HOSP IP/OBS MODERATE 35: CPT

## 2021-06-20 RX ADMIN — FINASTERIDE 5 MILLIGRAM(S): 5 TABLET, FILM COATED ORAL at 11:22

## 2021-06-20 RX ADMIN — PANTOPRAZOLE SODIUM 40 MILLIGRAM(S): 20 TABLET, DELAYED RELEASE ORAL at 06:45

## 2021-06-20 RX ADMIN — Medication 1 MILLIGRAM(S): at 11:22

## 2021-06-20 RX ADMIN — ENOXAPARIN SODIUM 40 MILLIGRAM(S): 100 INJECTION SUBCUTANEOUS at 11:22

## 2021-06-20 RX ADMIN — Medication 1 MILLIGRAM(S): at 22:28

## 2021-06-20 RX ADMIN — SENNA PLUS 2 TABLET(S): 8.6 TABLET ORAL at 22:28

## 2021-06-20 RX ADMIN — SIMVASTATIN 10 MILLIGRAM(S): 20 TABLET, FILM COATED ORAL at 22:28

## 2021-06-20 RX ADMIN — Medication 500 MILLIGRAM(S): at 11:22

## 2021-06-20 NOTE — PROGRESS NOTE ADULT - SUBJECTIVE AND OBJECTIVE BOX
Patient is a 89y old  Male who presents with a chief complaint of Acute Altered in mental status (19 Jun 2021 14:30)      INTERVAL /OVERNIGHT EVENTS: resting comfortably, now in retention     T(C): 36.8 (06-20-21 @ 21:11), Max: 36.8 (06-20-21 @ 13:34)  HR: 74 (06-20-21 @ 21:11) (74 - 93)  BP: 103/59 (06-20-21 @ 21:11) (103/59 - 123/73)  RR: 17 (06-20-21 @ 21:11) (16 - 17)  SpO2: 91% (06-20-21 @ 21:11) (91% - 92%)      MEDICATIONS  (STANDING):  ascorbic acid 500 milliGRAM(s) Oral daily  dextrose 5%. 1000 milliLiter(s) (50 mL/Hr) IV Continuous <Continuous>  doxazosin 1 milliGRAM(s) Oral at bedtime  enoxaparin Injectable 40 milliGRAM(s) SubCutaneous every 24 hours  finasteride 5 milliGRAM(s) Oral daily  folic acid 1 milliGRAM(s) Oral daily  pantoprazole    Tablet 40 milliGRAM(s) Oral before breakfast  polyethylene glycol 3350 17 Gram(s) Oral at bedtime  senna 2 Tablet(s) Oral at bedtime  simvastatin 10 milliGRAM(s) Oral at bedtime    MEDICATIONS  (PRN):  acetaminophen   Tablet .. 650 milliGRAM(s) Oral every 6 hours PRN Mild Pain (1 - 3)  bisacodyl Suppository 10 milliGRAM(s) Rectal once PRN Constipation  magnesium hydroxide Suspension 30 milliLiter(s) Oral daily PRN Constipation  meclizine 12.5 milliGRAM(s) Oral three times a day PRN Dizziness  morphine  - Injectable 2 milliGRAM(s) IV Push every 4 hours PRN Severe Pain (7 - 10)  ondansetron Injectable 4 milliGRAM(s) IV Push every 6 hours PRN Nausea and/or Vomiting  traMADol 50 milliGRAM(s) Oral every 6 hours PRN Moderate Pain (4 - 6)    Allergies    shellfish (Rash)  Sulfa druga - Rash (Rash)  sulfa drugs (Hives)    Intolerances        REVIEW OF SYSTEMS:  denies      PHYSICAL EXAM:  GENERAL: NAD, well-groomed, well-developed  HEAD:  Atraumatic, Normocephalic  EYES: EOMI, PERRLA, conjunctiva and sclera clear  NECK: Supple, No JVD, Normal thyroid  NERVOUS SYSTEM: ; Motor Strength 5/5 B/L upper and lower extremities; DTRs 2+ intact and symmetric  CHEST/LUNG: Clear to auscultation bilaterally; No rales, rhonchi, wheezing, or rubs  HEART: Regular rate and rhythm; No murmurs, rubs, or gallops  ABDOMEN: Soft, Nontender, Nondistended; Bowel sounds present  EXTREMITIES:  2+ Peripheral Pulses, No clubbing, cyanosis, or edema  LYMPH: No lymphadenopathy noted  SKIN: No rashes or lesions                          8.9    6.49  )-----------( 177      ( 20 Jun 2021 05:32 )             27.9               143|111|35<119  3.8|28|1.40  8.3,--,--  06-20 @ 05:32          CAPILLARY BLOOD GLUCOSE          RADIOLOGY & ADDITIONAL TESTS:    Notes Reviewed:  [ x] YES  [ ] NO    Care Discussed with Consultants/Other Providers [x ] YES  [ ] NO

## 2021-06-20 NOTE — PROGRESS NOTE ADULT - ASSESSMENT
89 yo M with PMH of HTN, HLD, dementia, A-fib, BPH, s/p aortic aneurysm repair with AVR, PPM ([email or web address]) presented to ED with worsening confusion and fall, found to have R femoral neck fracture. Cardiology consulted for cardiac clearance.     R femoral neck fracture  - S/P R Hip hemiarthroplasty, tolerated procedure well from CV standpoint  - PPM interrogation 06/16/2021 showed remaining longevity 1.5 years, multiple ATR, A paced 68%, V paved 41%  - Echo from 2016, normal LVEF  - No evidence of volume overload  - pain control, PT, hip precautions per ortho  - asa?    PAF  - HR:    - EKG showing NSR with RBBB with signs of intermittent V-pacing  - hrs slightly higher than before, and hb trending down.    Hypertension  - BP has been labile  - Not on antihypertensives (other than cardura) and would keep off  - encourage po intake, as he has hiren and hypernatremia  - renal function improving with d5w  - Monitor routine hemodynamics     Hyperlipidemia  - Continue Zocor    - Monitor and replete lytes, keep K>4, Mg>2.  - All other medical needs as per primary team.  - Other cardiovascular workup will depend on clinical course.  - Will continue to follow.

## 2021-06-20 NOTE — PROGRESS NOTE ADULT - SUBJECTIVE AND OBJECTIVE BOX
Patient seen and examined. Pain controlled. No acute events overnight per nursing    MEDICATIONS  (STANDING):  ascorbic acid 500 milliGRAM(s) Oral daily  aspirin enteric coated 325 milliGRAM(s) Oral two times a day  dextrose 5%. 1000 milliLiter(s) IV Continuous <Continuous>  finasteride 5 milliGRAM(s) Oral daily  folic acid 1 milliGRAM(s) Oral daily  pantoprazole    Tablet 40 milliGRAM(s) Oral before breakfast  polyethylene glycol 3350 17 Gram(s) Oral at bedtime  senna 2 Tablet(s) Oral at bedtime  simvastatin 10 milliGRAM(s) Oral at bedtime    Allergies    shellfish (Rash)  Sulfa druga - Rash (Rash)  sulfa drugs (Hives)    Vital Signs Last 24 Hrs  T(C): 36.9 (20 Jun 2021 05:30), Max: 36.9 (20 Jun 2021 05:30)  T(F): 98.5 (20 Jun 2021 05:30), Max: 98.5 (20 Jun 2021 05:30)  HR: 94 (20 Jun 2021 05:30) (70 - 100)  BP: 116/64 (20 Jun 2021 05:30) (92/56 - 119/50)  BP(mean): --  RR: 17 (20 Jun 2021 05:30) (16 - 17)  SpO2: 92% (20 Jun 2021 05:30) (90% - 92%)    LABS:                        8.9    6.49  )-----------( 177      ( 20 Jun 2021 05:32 )             27.9     20 Jun 2021 05:32    143    |  111    |  35     ----------------------------<  119    3.8     |  28     |  1.40     Ca    8.3        20 Jun 2021 05:32            Physical Exam  Gen: NAD  RLE:   Dressing clean/dry/intact  abduction pillow in place  + EHL/FHL/TA/GSC   SILT L2-S1   DP Intact   No calf ttp  Compartments soft    A/P:  89y Male sp R hip maxime POD# 3  Pain control  DVT ppx, aspirin 325 daily  PT/WBAT/Posterior hip precautions/abduction pillow/OOB  Please keep abduction pillow in place as patient is demented and will not follow posterior hip precautions  FU labs  Medical management appreciated  Incentive spirometry  Dispo planning, SAV

## 2021-06-20 NOTE — PROGRESS NOTE ADULT - SUBJECTIVE AND OBJECTIVE BOX
Patient is a 89y old  Male who presents with a chief complaint of Acute Altered in mental status (16 Jun 2021 07:45)       HPI:  Chart and labs reviewed.  FAM GRANADOS is a 89y Male presented to the ED from home with son for generalized weakness and worsening confusion and a fall on Monday morning around 3 am. At baseline patient ambulates with walker. Son explains they have cameras in the house and saw him trip and fall Monday morning. Since then patient has been more confused than baseline and unable ambulate. Patient unable to provide history due to dementia. As per son, no sob, vomiting or diarrhea. no blood thinners. Patient currently takes no medications daily.     Renal is being consulted for elevated BUN/Cr. Patient is a very poor historian. No other specific h/o. Unknown baseline renal function     Follow up acute on CKD Stage 3  No acute events noted       (15 Nigel 2021 22:44)       PAST MEDICAL & SURGICAL HISTORY:  HTN (hypertension)    HLD (hyperlipidemia)    h/o Aortic aneurysm    h/o TIA (transient ischemic attack) 6 yrs ago    h/o Disc prolapse Lumbar    h/o Symptomatic Bradycardia    h/o  ? Atrial fibrillation    Hyperlipidemia    Benign essential hypertension    Aortic valve disorder    H/O unilateral nephrectomy    Aortic prosthetic valve regurgitation    Artificial pacemaker    Calculus of ureter  2/2015    h/o Lumbar laminectomy    Pacemaker  Oct 2011    s/p Aortic aneurysm repair  10/25/2011    Aortic valve replacement Oct 2011    Patent foramen ovale repair         FAMILY HISTORY:  No pertinent family history in first degree relatives    [place]    Social History:Non smoker    MEDICATIONS  (STANDING):  ascorbic acid 500 milliGRAM(s) Oral daily  finasteride 5 milliGRAM(s) Oral daily  folic acid 1 milliGRAM(s) Oral daily  lactated ringers. 1000 milliLiter(s) (75 mL/Hr) IV Continuous <Continuous>  simvastatin 10 milliGRAM(s) Oral at bedtime    MEDICATIONS  (PRN):  acetaminophen   Tablet .. 650 milliGRAM(s) Oral every 6 hours PRN Mild Pain (1 - 3)  meclizine 12.5 milliGRAM(s) Oral three times a day PRN Dizziness  morphine  - Injectable 2 milliGRAM(s) IV Push every 4 hours PRN Severe Pain (7 - 10)      Allergies    shellfish (Rash)  Sulfa drugs - Rash (Rash)  sulfa drugs (Hives)    Intolerances         REVIEW OF SYSTEMS: NA. Confused    Vital Signs Last 24 Hrs  T(C): 36.8 (19 Jun 2021 22:16), Max: 36.8 (19 Jun 2021 22:16)  T(F): 98.2 (19 Jun 2021 22:16), Max: 98.2 (19 Jun 2021 22:16)  HR: 100 (19 Jun 2021 22:16) (70 - 100)  BP: 119/50 (19 Jun 2021 22:16) (92/56 - 119/50)  BP(mean): --  RR: 16 (19 Jun 2021 22:16) (16 - 16)  SpO2: 90% (19 Jun 2021 22:16) (90% - 92%)    PHYSICAL EXAM:    GENERAL: No distress; dry oral mucosa  HEAD:  Atraumatic, Normocephalic  NECK: Supple  NERVOUS SYSTEM: Confused  CHEST/LUNG: Clear to percussion bilaterally; No rales, rhonchi, wheezing, or rubs  HEART: Regular rate and rhythm; No murmurs, rubs, or gallops  ABDOMEN: Soft, Nontender, Nondistended; Bowel sounds present  EXTREMITIES:  No Edema  SKIN: No rashes No obvious ecchymosis      LABS:                                       8.9    6.49  )-----------( 177      ( 20 Jun 2021 05:32 )             27.9     06-20    143  |  111<H>  |  35<H>  ----------------------------<  119<H>  3.8   |  28  |  1.40<H>    Ca    8.3<L>      20 Jun 2021 05:32  Phos  2.8     06-19  Mg     2.4     06-19

## 2021-06-20 NOTE — CONSULT NOTE ADULT - ASSESSMENT
89M with fall and subsequent hip fx and now s/p hemiarthroplasty     Patient is a candidate for subacute rehab with eventual long term care type setting as far as discharge disposition given his low level prior cognitive and functional status. Given his arthroplasty recently, fall, patient stands to benefit from PT/OT to reduce caregiver burden with respect to ADL's and mobility which he is mostly performing at max A/dependent level.    For pain control, adding standing tylenol 500-1g TID, lidocaine patches could help with overall pain control.

## 2021-06-20 NOTE — CONSULT NOTE ADULT - SUBJECTIVE AND OBJECTIVE BOX
Physical Medicine and Rehabilitation Initial Evaluation    Patients acute care records reviewed and are summarized as follows:     Patient is a 89FM with past medical history including dementia, unilateral nephrectomy, ureteral calculus, aortic regurgitation, HTN, HLD, afib, bradycardia, lumbar dis prolapse, TIA, aortic aneurysm s/p repair, HTN, HLD, PFO, laminectomy who is admitted to acute care following a fall during which he sustained R hip Fx and is now s/p R hemiarthroplasty.    Medical studies/laboratory studies reviewed, includin.9    6.49  )-----------( 177      ( 2021 05:32 )             27.9   06-20    143  |  111<H>  |  35<H>  ----------------------------<  119<H>  3.8   |  28  |  1.40<H>    Ca    8.3<L>      2021 05:32  Phos  2.8     06-19  Mg     2.4     06-19        The patient was seen and examined at bedside. Patient drowsy, limited participation in examination due to this and due to low level baseline cognitive status. PAtient at baseline has aide 7 hours a day 7 days a week, lives in a private house. Functionally at this time patient Max a with most ADL's and mobility.     ROS: Not able to meaningfully participate secondary to dementia    PM, Social, Family Hx: as above in HPI, Family history reviewed and found to be non pertinent to patients current disposition, no history of alcohol, illicit drug use, tobacco use.    Physical Exam:   Vitals: Vital Signs Last 24 Hrs  T(C): 36.8 (2021 21:11), Max: 36.9 (2021 05:30)  T(F): 98.3 (2021 21:11), Max: 98.5 (2021 05:30)  HR: 74 (2021 21:11) (74 - 98)  BP: 103/59 (2021 21:11) (103/59 - 123/73)  BP(mean): --  RR: 17 (2021 21:11) (16 - 17)  SpO2: 91% (2021 21:11) (91% - 92%)    Constitutional: Gen: In no acute distress, limited participation with exam and questioning   Eyes: PERRL, no erythema of conjunctivae  Ears/Nose/Mouth/Throat: Mucous membranes moist, no thrush, no rhinorrhea  CV: Regular rate and rhythm, S1 S2, bilateral lower extremity pitting peripheral edema, pedal pulses intact  Resp: Good respiratory effort, Good air movement all lung fields  GI: Nontender, normoactive bowel sounds  Neuro: Cranial Nerves not able to test secondary to command following. Sensation intact to light touch in peripheral upper and lower extremities  MSK: No cyanosis or clubbing of nails, strength 1 to 3-/5 in bilateral upper and lower extremities difficulty isolating testing due to impaired command following, ROM full in upper extremities passively but lower extremities with increased tone, resistance to full ROM and hips and knees in flexed adducted positioning, abduction pillow in place  Neck: No midline tenderness to palpation, supple  Skin: No rashes on limbs, normal temperature on palpation  Psychiatric: Drowsy, oriented only to self able to state his full name

## 2021-06-20 NOTE — PROGRESS NOTE ADULT - ASSESSMENT
RAMIRO/CKD  Fall/R femoral neck fracture  S/P R Hip hemiarthroplasty  HTN  Dementia  Hypernatremia    -Unknown baseline renal function; likely Stage 3 given age. +RAMIRO component.   -Checked CPK - normal range  -Urine studies - reviewed   -Renal indices are improving again, will monitor trend, Hypernatremia resolved  -D5W until completion; PO intake encouraged; will continue to monitor    Thank you

## 2021-06-20 NOTE — PROGRESS NOTE ADULT - ASSESSMENT
89 yr old male w dementia, PPM, aortic aneurysm repair, ?afib  adm w right femoral neck fracture  acute kidney injury- most likely secondary to dehydration, indices improving and will cont ivf  cardio clearance noted and appreciated      S/P HEMIARTHROPLASTY   DVT ppx, aspirin 325 daily  PT/WBAT/Posterior hip precautions/abduction pillow/OOB

## 2021-06-20 NOTE — PROGRESS NOTE ADULT - SUBJECTIVE AND OBJECTIVE BOX
[facility] - Jian Newman, Katrina, Brandy, Beata, Hunter Reynolds  Office Number:  [phone]    Patient resting comfortably in bed in NAD.  Sleeping comfortably  unable to obtain reliable history    ROS: negative unless otherwise mentioned.    Telemetry:  off    MEDICATIONS  (STANDING):  ascorbic acid 500 milliGRAM(s) Oral daily  dextrose 5%. 1000 milliLiter(s) (50 mL/Hr) IV Continuous <Continuous>  doxazosin 1 milliGRAM(s) Oral at bedtime  enoxaparin Injectable 40 milliGRAM(s) SubCutaneous every 24 hours  finasteride 5 milliGRAM(s) Oral daily  folic acid 1 milliGRAM(s) Oral daily  pantoprazole    Tablet 40 milliGRAM(s) Oral before breakfast  polyethylene glycol 3350 17 Gram(s) Oral at bedtime  senna 2 Tablet(s) Oral at bedtime  simvastatin 10 milliGRAM(s) Oral at bedtime    MEDICATIONS  (PRN):  acetaminophen   Tablet .. 650 milliGRAM(s) Oral every 6 hours PRN Mild Pain (1 - 3)  bisacodyl Suppository 10 milliGRAM(s) Rectal once PRN Constipation  magnesium hydroxide Suspension 30 milliLiter(s) Oral daily PRN Constipation  meclizine 12.5 milliGRAM(s) Oral three times a day PRN Dizziness  morphine  - Injectable 2 milliGRAM(s) IV Push every 4 hours PRN Severe Pain (7 - 10)  ondansetron Injectable 4 milliGRAM(s) IV Push every 6 hours PRN Nausea and/or Vomiting  traMADol 50 milliGRAM(s) Oral every 6 hours PRN Moderate Pain (4 - 6)      Allergies    shellfish (Rash)  Sulfa druga - Rash (Rash)  sulfa drugs (Hives)    Intolerances        Vital Signs Last 24 Hrs  T(C): 36.8 (20 Jun 2021 13:34), Max: 36.9 (20 Jun 2021 05:30)  T(F): 98.3 (20 Jun 2021 13:34), Max: 98.5 (20 Jun 2021 05:30)  HR: 93 (20 Jun 2021 13:34) (93 - 100)  BP: 123/73 (20 Jun 2021 13:34) (109/62 - 123/73)  BP(mean): --  RR: 16 (20 Jun 2021 13:34) (16 - 17)  SpO2: 92% (20 Jun 2021 13:34) (90% - 92%)    I&O's Summary    19 Jun 2021 07:01  -  20 Jun 2021 07:00  --------------------------------------------------------  IN: 450 mL / OUT: 1200 mL / NET: -750 mL        ON EXAM:    Appearance: NAD, no distress, alert, frail   HEENT: Moist Mucous Membranes, Anicteric  Cardiovascular: Regular rate and rhythm, Normal S1 S2, No JVD, No murmurs, No rubs, gallops or clicks  Respiratory: Non-labored, Diminished at bases , No rales, No rhonchi, No wheezing.   Gastrointestinal:  Soft, Non-tender, + BS  : [place]ens[place] clear yellow urine  Skin: Warm and dry, No visible rashes or ulcers, No ecchymosis, No cyanosis Right hip with intact dsg  Musculoskeletal: No clubbing, No cyanosis, right hip[ joint swelling/tenderness  Psychiatry: confused  Lymph: right LE mild  edema.     LABS: All Labs Reviewed:                        8.9    6.49  )-----------( 177      ( 20 Jun 2021 05:32 )             27.9                         9.6    8.15  )-----------( 182      ( 19 Jun 2021 06:07 )             29.2                         10.1   6.02  )-----------( 189      ( 18 Jun 2021 06:43 )             30.9     20 Jun 2021 05:32    143    |  111    |  35     ----------------------------<  119    3.8     |  28     |  1.40   19 Jun 2021 06:06    146    |  111    |  34     ----------------------------<  98     3.7     |  25     |  1.60   18 Jun 2021 06:43    143    |  110    |  34     ----------------------------<  114    4.2     |  23     |  1.60     Ca    8.3        20 Jun 2021 05:32  Ca    8.3        19 Jun 2021 06:06  Ca    8.1        18 Jun 2021 06:43  Phos  2.8       19 Jun 2021 06:06  Mg     2.4       19 Jun 2021 06:06            Blood Culture: Organism --  Gram Stain Blood -- Gram Stain --  Specimen Source .Urine Clean Catch (Midstream)  Culture-Blood --

## 2021-06-21 DIAGNOSIS — D64.9 ANEMIA, UNSPECIFIED: ICD-10-CM

## 2021-06-21 LAB
ALBUMIN SERPL ELPH-MCNC: 2.3 G/DL — LOW (ref 3.3–5)
ALP SERPL-CCNC: 141 U/L — HIGH (ref 40–120)
ALT FLD-CCNC: 39 U/L — SIGNIFICANT CHANGE UP (ref 12–78)
ANION GAP SERPL CALC-SCNC: 5 MMOL/L — SIGNIFICANT CHANGE UP (ref 5–17)
AST SERPL-CCNC: 101 U/L — HIGH (ref 15–37)
BILIRUB SERPL-MCNC: 0.7 MG/DL — SIGNIFICANT CHANGE UP (ref 0.2–1.2)
BUN SERPL-MCNC: 39 MG/DL — HIGH (ref 7–23)
CALCIUM SERPL-MCNC: 8.3 MG/DL — LOW (ref 8.5–10.1)
CHLORIDE SERPL-SCNC: 110 MMOL/L — HIGH (ref 96–108)
CO2 SERPL-SCNC: 28 MMOL/L — SIGNIFICANT CHANGE UP (ref 22–31)
CREAT SERPL-MCNC: 1.5 MG/DL — HIGH (ref 0.5–1.3)
GLUCOSE SERPL-MCNC: 102 MG/DL — HIGH (ref 70–99)
HCT VFR BLD CALC: 24.5 % — LOW (ref 39–50)
HCT VFR BLD CALC: 25.8 % — LOW (ref 39–50)
HGB BLD-MCNC: 8.1 G/DL — LOW (ref 13–17)
HGB BLD-MCNC: 8.2 G/DL — LOW (ref 13–17)
MCHC RBC-ENTMCNC: 29.7 PG — SIGNIFICANT CHANGE UP (ref 27–34)
MCHC RBC-ENTMCNC: 31.8 GM/DL — LOW (ref 32–36)
MCV RBC AUTO: 93.5 FL — SIGNIFICANT CHANGE UP (ref 80–100)
NRBC # BLD: 0 /100 WBCS — SIGNIFICANT CHANGE UP (ref 0–0)
PLATELET # BLD AUTO: 202 K/UL — SIGNIFICANT CHANGE UP (ref 150–400)
POTASSIUM SERPL-MCNC: 3.6 MMOL/L — SIGNIFICANT CHANGE UP (ref 3.5–5.3)
POTASSIUM SERPL-SCNC: 3.6 MMOL/L — SIGNIFICANT CHANGE UP (ref 3.5–5.3)
PROT SERPL-MCNC: 5.9 G/DL — LOW (ref 6–8.3)
RBC # BLD: 2.76 M/UL — LOW (ref 4.2–5.8)
RBC # FLD: 13.6 % — SIGNIFICANT CHANGE UP (ref 10.3–14.5)
SODIUM SERPL-SCNC: 143 MMOL/L — SIGNIFICANT CHANGE UP (ref 135–145)
WBC # BLD: 5.64 K/UL — SIGNIFICANT CHANGE UP (ref 3.8–10.5)
WBC # FLD AUTO: 5.64 K/UL — SIGNIFICANT CHANGE UP (ref 3.8–10.5)

## 2021-06-21 PROCEDURE — 99232 SBSQ HOSP IP/OBS MODERATE 35: CPT

## 2021-06-21 RX ORDER — DOXAZOSIN MESYLATE 4 MG
2 TABLET ORAL AT BEDTIME
Refills: 0 | Status: DISCONTINUED | OUTPATIENT
Start: 2021-06-21 | End: 2021-06-22

## 2021-06-21 RX ADMIN — Medication 650 MILLIGRAM(S): at 11:08

## 2021-06-21 RX ADMIN — SIMVASTATIN 10 MILLIGRAM(S): 20 TABLET, FILM COATED ORAL at 22:30

## 2021-06-21 RX ADMIN — POLYETHYLENE GLYCOL 3350 17 GRAM(S): 17 POWDER, FOR SOLUTION ORAL at 22:30

## 2021-06-21 RX ADMIN — ENOXAPARIN SODIUM 40 MILLIGRAM(S): 100 INJECTION SUBCUTANEOUS at 11:08

## 2021-06-21 RX ADMIN — SENNA PLUS 2 TABLET(S): 8.6 TABLET ORAL at 22:30

## 2021-06-21 RX ADMIN — Medication 650 MILLIGRAM(S): at 05:43

## 2021-06-21 RX ADMIN — Medication 650 MILLIGRAM(S): at 11:50

## 2021-06-21 RX ADMIN — PANTOPRAZOLE SODIUM 40 MILLIGRAM(S): 20 TABLET, DELAYED RELEASE ORAL at 05:43

## 2021-06-21 RX ADMIN — Medication 500 MILLIGRAM(S): at 11:07

## 2021-06-21 RX ADMIN — FINASTERIDE 5 MILLIGRAM(S): 5 TABLET, FILM COATED ORAL at 11:07

## 2021-06-21 RX ADMIN — Medication 650 MILLIGRAM(S): at 06:43

## 2021-06-21 RX ADMIN — Medication 1 MILLIGRAM(S): at 11:07

## 2021-06-21 RX ADMIN — Medication 2 MILLIGRAM(S): at 22:30

## 2021-06-21 NOTE — PROGRESS NOTE ADULT - ASSESSMENT
89 yo M with PMH of HTN, HLD, dementia, A-fib, BPH, s/p aortic aneurysm repair with AVR, PPM ([employer]) presented to ED with worsening confusion and fall, found to have R femoral neck fracture. Cardiology consulted for cardiac clearance.     R femoral neck fracture  - S/P R Hip hemiarthroplasty, tolerated procedure well from CV standpoint  - PPM interrogation 06/16/2021 showed remaining longevity 1.5 years, multiple ATR, A paced 68%, V paved 41%  - Echo from 2016, normal LVEF  - No evidence of volume overload  - pain control, PT, hip precautions per ortho  - asa?    PAF  - HR stable : 91 (06-21 @ 13:02) (67 - 91) per flow sheet  - EKG showing NSR with RBBB with signs of intermittent V-pacing    Hypertension  - BP: 110/59 (06-21-21 @ 13:02) (103/59 - 139/51)  - Not on antihypertensives (other than cardura) and would keep off  - encourage po intake, as he has RAMIRO    - renal function and hypernatremia  improved with ivf  - Monitor routine hemodynamics     - Monitor and replete lytes, keep K>4, Mg>2.  - All other medical needs as per primary team.  - Other cardiovascular workup will depend on clinical course.  - Will continue to follow.    Rafia Lane, MS ANP, [facility]  Nurse Practitioner- Cardiology   Spectra #[phone]  Hyperlipidemia  - Continue Zocor

## 2021-06-21 NOTE — PROGRESS NOTE ADULT - SUBJECTIVE AND OBJECTIVE BOX
[facility] -- Jian Newman Grossman, W[place]man, Rodolfo Cota Savella, Goodger: Office # [phone]    Follow Up:  Pre and Postop Cardiac Optimization     Subjective/Observations: Patient seen and examined. Patient awake, confused, following commands. Unable to provide meaningful information. Tolerating room air.     REVIEW OF SYSTEMS: All review of systems is negative for eye, ENT, GI, , allergic, dermatologic, musculoskeletal and neurologic except as described above    PAST MEDICAL & SURGICAL HISTORY:  HTN (hypertension)    HLD (hyperlipidemia)    h/o Aortic aneurysm    h/o TIA (transient ischemic attack) 6 yrs ago    h/o Disc prolapse Lumbar    h/o Symptomatic Bradycardia    h/o  ? Atrial fibrillation    Hyperlipidemia    Benign essential hypertension    Aortic valve disorder    H/O unilateral nephrectomy    Aortic prosthetic valve regurgitation    Artificial pacemaker    Calculus of ureter  2/2015    h/o Lumbar laminectomy    Pacemaker  Oct 2011    s/p Aortic aneurysm repair  10/25/2011    Aortic valve replacement Oct 2011    Patent foramen ovale repair        MEDICATIONS  (STANDING):  ascorbic acid 500 milliGRAM(s) Oral daily  doxazosin 2 milliGRAM(s) Oral at bedtime  enoxaparin Injectable 40 milliGRAM(s) SubCutaneous every 24 hours  finasteride 5 milliGRAM(s) Oral daily  folic acid 1 milliGRAM(s) Oral daily  pantoprazole    Tablet 40 milliGRAM(s) Oral before breakfast  polyethylene glycol 3350 17 Gram(s) Oral at bedtime  senna 2 Tablet(s) Oral at bedtime  simvastatin 10 milliGRAM(s) Oral at bedtime    MEDICATIONS  (PRN):  acetaminophen   Tablet .. 650 milliGRAM(s) Oral every 6 hours PRN Mild Pain (1 - 3)  bisacodyl Suppository 10 milliGRAM(s) Rectal once PRN Constipation  magnesium hydroxide Suspension 30 milliLiter(s) Oral daily PRN Constipation  meclizine 12.5 milliGRAM(s) Oral three times a day PRN Dizziness  morphine  - Injectable 2 milliGRAM(s) IV Push every 4 hours PRN Severe Pain (7 - 10)  ondansetron Injectable 4 milliGRAM(s) IV Push every 6 hours PRN Nausea and/or Vomiting  traMADol 50 milliGRAM(s) Oral every 6 hours PRN Moderate Pain (4 - 6)    Allergies    shellfish (Rash)  Sulfa druga - Rash (Rash)  sulfa drugs (Hives)    Intolerances      Vital Signs Last 24 Hrs  T(C): 36.9 (21 Jun 2021 13:02), Max: 36.9 (21 Jun 2021 13:02)  T(F): 98.4 (21 Jun 2021 13:02), Max: 98.4 (21 Jun 2021 13:02)  HR: 91 (21 Jun 2021 13:02) (67 - 91)  BP: 110/59 (21 Jun 2021 13:02) (103/59 - 139/51)  BP(mean): --  RR: 18 (21 Jun 2021 13:02) (17 - 18)  SpO2: 91% (21 Jun 2021 13:02) (91% - 93%)  I&O's Summary    20 Jun 2021 07:01  -  21 Jun 2021 07:00  --------------------------------------------------------  IN: 250 mL / OUT: 100 mL / NET: 150 mL          TELE: Not on telemetry   PHYSICAL EXAM:  Appearance: NAD, no distress, alert, frail   HEENT: Moist Mucous Membranes, Anicteric  Cardiovascular: Regular rate and rhythm, Normal S1 S2, No JVD, No murmurs, No rubs, gallops or clicks  Respiratory: Non-labored, Clear to auscultation, No rales, No rhonchi, No wheezing.   Gastrointestinal:  Soft, Non-tender, + BS  Neurologic: Non-focal  Skin: Warm and dry, No visible rashes or ulcers, No ecchymosis, No cyanosis  Musculoskeletal: No clubbing, No cyanosis, RLE joint swelling/tenderness  Psychiatry confused  Lymph: Mild RLE  peripheral edema.     LABS: All Labs Reviewed:                        8.9    6.49  )-----------( 177      ( 20 Jun 2021 05:32 )             27.9                         9.6    8.15  )-----------( 182      ( 19 Jun 2021 06:07 )             29.2     20 Jun 2021 05:32    143    |  111    |  35     ----------------------------<  119    3.8     |  28     |  1.40   19 Jun 2021 06:06    146    |  111    |  34     ----------------------------<  98     3.7     |  25     |  1.60     Ca    8.3        20 Jun 2021 05:32  Ca    8.3        19 Jun 2021 06:06  Phos  2.8       19 Jun 2021 06:06  Mg     2.4       19 Jun 2021 06:06        Creatine Kinase, Serum: 207 U/L (06-17-21 @ 06:03)

## 2021-06-21 NOTE — PROGRESS NOTE ADULT - ASSESSMENT
RAMIRO/CKD  Fall/R femoral neck fracture  S/P R Hip hemiarthroplasty  HTN  Dementia  Hypernatremia    -Unknown baseline renal function; likely Stage 3 given age. +RAMIRO component.   -Checked CPK - normal range  -Urine studies - reviewed   -Renal indices are improving again, will monitor trend, Hypernatremia resolved  -D5W until completion; PO intake encouraged; will continue to monitor  -BP controlled    Thank you

## 2021-06-21 NOTE — PROGRESS NOTE ADULT - SUBJECTIVE AND OBJECTIVE BOX
Patient is a 89y old  Male who presents with a chief complaint of Acute Altered in mental status (21 Jun 2021 14:18)      INTERVAL /OVERNIGHT EVENTS: now with bean, arousable easily    MEDICATIONS  (STANDING):  ascorbic acid 500 milliGRAM(s) Oral daily  doxazosin 2 milliGRAM(s) Oral at bedtime  enoxaparin Injectable 40 milliGRAM(s) SubCutaneous every 24 hours  finasteride 5 milliGRAM(s) Oral daily  folic acid 1 milliGRAM(s) Oral daily  pantoprazole    Tablet 40 milliGRAM(s) Oral before breakfast  polyethylene glycol 3350 17 Gram(s) Oral at bedtime  senna 2 Tablet(s) Oral at bedtime  simvastatin 10 milliGRAM(s) Oral at bedtime    MEDICATIONS  (PRN):  acetaminophen   Tablet .. 650 milliGRAM(s) Oral every 6 hours PRN Mild Pain (1 - 3)  bisacodyl Suppository 10 milliGRAM(s) Rectal once PRN Constipation  magnesium hydroxide Suspension 30 milliLiter(s) Oral daily PRN Constipation  meclizine 12.5 milliGRAM(s) Oral three times a day PRN Dizziness  morphine  - Injectable 2 milliGRAM(s) IV Push every 4 hours PRN Severe Pain (7 - 10)  ondansetron Injectable 4 milliGRAM(s) IV Push every 6 hours PRN Nausea and/or Vomiting  traMADol 50 milliGRAM(s) Oral every 6 hours PRN Moderate Pain (4 - 6)      Allergies    shellfish (Rash)  Sulfa druga - Rash (Rash)  sulfa drugs (Hives)    Intolerances        REVIEW OF SYSTEMS:  unable to obtain    Vital Signs Last 24 Hrs  T(C): 36.9 (21 Jun 2021 13:02), Max: 36.9 (21 Jun 2021 13:02)  T(F): 98.4 (21 Jun 2021 13:02), Max: 98.4 (21 Jun 2021 13:02)  HR: 91 (21 Jun 2021 13:02) (67 - 91)  BP: 110/59 (21 Jun 2021 13:02) (103/59 - 139/51)  BP(mean): --  RR: 18 (21 Jun 2021 13:02) (17 - 18)  SpO2: 91% (21 Jun 2021 13:02) (91% - 93%)    PHYSICAL EXAM:  GENERAL: NAD, well-groomed, well-developed  HEAD:  Atraumatic, Normocephalic  EYES: EOMI, PERRLA, conjunctiva and sclera clear  ENMT: No tonsillar erythema, exudates, or enlargement; Moist mucous membranes, Good dentition, No lesions  NECK: Supple, No JVD, Normal thyroid  NERVOUS SYSTEM:  Alert & awake; Motor Strength 5/5 B/L upper and lower extremities; DTRs 2+ intact and symmetric  CHEST/LUNG: Clear to auscultation bilaterally; No rales, rhonchi, wheezing, or rubs  HEART: Regular rate and rhythm; No murmurs, rubs, or gallops  ABDOMEN: Soft, Nontender, Nondistended; Bowel sounds present  EXTREMITIES:  2+ Peripheral Pulses, No clubbing, cyanosis, or edema  LYMPH: No lymphadenopathy noted  SKIN: No rashes or lesions    LABS:                        8.2    5.64  )-----------( 202      ( 21 Jun 2021 14:31 )             25.8     21 Jun 2021 14:31    143    |  110    |  39     ----------------------------<  102    3.6     |  28     |  1.50     Ca    8.3        21 Jun 2021 14:31    TPro  5.9    /  Alb  2.3    /  TBili  0.7    /  DBili  x      /  AST  101    /  ALT  39     /  AlkPhos  141    21 Jun 2021 14:31        CAPILLARY BLOOD GLUCOSE      POCT Blood Glucose.: 121 mg/dL (21 Jun 2021 12:09)      RADIOLOGY & ADDITIONAL TESTS:    Notes Reviewed:  [x ] YES  [ ] NO    Care Discussed with Consultants/Other Providers [x ] YES  [ ] NO

## 2021-06-21 NOTE — PROGRESS NOTE ADULT - SUBJECTIVE AND OBJECTIVE BOX
Patient is a 89y old  Male who presents with a chief complaint of Acute Altered in mental status (16 Jun 2021 07:45)       HPI:  Chart and labs reviewed.  FAM GRANADOS is a 89y Male presented to the ED from home with son for generalized weakness and worsening confusion and a fall on Monday morning around 3 am. At baseline patient ambulates with walker. Son explains they have cameras in the house and saw him trip and fall Monday morning. Since then patient has been more confused than baseline and unable ambulate. Patient unable to provide history due to dementia. As per son, no sob, vomiting or diarrhea. no blood thinners. Patient currently takes no medications daily.     Renal is being consulted for elevated BUN/Cr. Patient is a very poor historian. No other specific h/o. Unknown baseline renal function     Follow up acute on CKD Stage 3  No acute events noted       (15 Nigel 2021 22:44)       PAST MEDICAL & SURGICAL HISTORY:  HTN (hypertension)    HLD (hyperlipidemia)    h/o Aortic aneurysm    h/o TIA (transient ischemic attack) 6 yrs ago    h/o Disc prolapse Lumbar    h/o Symptomatic Bradycardia    h/o  ? Atrial fibrillation    Hyperlipidemia    Benign essential hypertension    Aortic valve disorder    H/O unilateral nephrectomy    Aortic prosthetic valve regurgitation    Artificial pacemaker    Calculus of ureter  2/2015    h/o Lumbar laminectomy    Pacemaker  Oct 2011    s/p Aortic aneurysm repair  10/25/2011    Aortic valve replacement Oct 2011    Patent foramen ovale repair         FAMILY HISTORY:  No pertinent family history in first degree relatives    [place]    Social History:Non smoker    MEDICATIONS  (STANDING):  ascorbic acid 500 milliGRAM(s) Oral daily  finasteride 5 milliGRAM(s) Oral daily  folic acid 1 milliGRAM(s) Oral daily  lactated ringers. 1000 milliLiter(s) (75 mL/Hr) IV Continuous <Continuous>  simvastatin 10 milliGRAM(s) Oral at bedtime    MEDICATIONS  (PRN):  acetaminophen   Tablet .. 650 milliGRAM(s) Oral every 6 hours PRN Mild Pain (1 - 3)  meclizine 12.5 milliGRAM(s) Oral three times a day PRN Dizziness  morphine  - Injectable 2 milliGRAM(s) IV Push every 4 hours PRN Severe Pain (7 - 10)      Allergies    shellfish (Rash)  Sulfa drugs - Rash (Rash)  sulfa drugs (Hives)    Intolerances         REVIEW OF SYSTEMS: NA. Confused    ICU Vital Signs Last 24 Hrs  T(C): 36.9 (21 Jun 2021 13:02), Max: 36.9 (21 Jun 2021 13:02)  T(F): 98.4 (21 Jun 2021 13:02), Max: 98.4 (21 Jun 2021 13:02)  HR: 91 (21 Jun 2021 13:02) (67 - 91)  BP: 110/59 (21 Jun 2021 13:02) (103/59 - 139/51)  BP(mean): --  ABP: --  ABP(mean): --  RR: 18 (21 Jun 2021 13:02) (17 - 18)  SpO2: 91% (21 Jun 2021 13:02) (91% - 93%)      PHYSICAL EXAM:    GENERAL: No distress; dry oral mucosa  HEAD:  Atraumatic, Normocephalic  NECK: Supple  NERVOUS SYSTEM: Confused  CHEST/LUNG: Clear to percussion bilaterally; No rales, rhonchi, wheezing, or rubs  HEART: Regular rate and rhythm; No murmurs, rubs, or gallops  ABDOMEN: Soft, Nontender, Nondistended; Bowel sounds present  EXTREMITIES:  No Edema  SKIN: No rashes No obvious ecchymosis      LABS:                                              8.9    6.49  )-----------( 177      ( 20 Jun 2021 05:32 )             27.9     06-20    143  |  111<H>  |  35<H>  ----------------------------<  119<H>  3.8   |  28  |  1.40<H>    Ca    8.3<L>      20 Jun 2021 05:32

## 2021-06-21 NOTE — PROGRESS NOTE ADULT - SUBJECTIVE AND OBJECTIVE BOX
89M POD4 s/p right hip hemiarthroplasty. Patient sleeping at encounter. Stable overnight. Minimal ambulation per PT.    On exam, right hip dressing clean, dry, intact LE's positioned in abduction pillow. Able to spontaneously move ankle/foot. Warm toes.    WBC - 6.02 --> 8.15 --> 5.64  H&H 10.1/30.9 --> 9.6/29.2 --> 8.9/27.9 --> 8.1/24.5    A/P: POD4 s/p right hip hemiarthroplasty.  RAMIOR - appreciate renal consultation; continue IVF  Urinary retention - bean placed; Urology care appreciated  Reg diet  PT/OT - WBAT/Posterior hip precautions  DVT Proph - [id number] BID x30days  Dispo - pending PT/OT status - likely SAV Scott MD  Orthopaedics

## 2021-06-21 NOTE — PROGRESS NOTE ADULT - SUBJECTIVE AND OBJECTIVE BOX
Patient seen and examined. Pain controlled. No acute events overnight.    MEDICATIONS  (STANDING):  ascorbic acid 500 milliGRAM(s) Oral daily  aspirin enteric coated 325 milliGRAM(s) Oral two times a day  dextrose 5%. 1000 milliLiter(s) IV Continuous <Continuous>  finasteride 5 milliGRAM(s) Oral daily  folic acid 1 milliGRAM(s) Oral daily  pantoprazole    Tablet 40 milliGRAM(s) Oral before breakfast  polyethylene glycol 3350 17 Gram(s) Oral at bedtime  senna 2 Tablet(s) Oral at bedtime  simvastatin 10 milliGRAM(s) Oral at bedtime    Allergies    shellfish (Rash)  Sulfa druga - Rash (Rash)  sulfa drugs (Hives)    Vital Signs Last 24 Hrs  T(C): 36.7 (21 Jun 2021 05:17), Max: 36.8 (20 Jun 2021 13:34)  T(F): 98.1 (21 Jun 2021 05:17), Max: 98.3 (20 Jun 2021 13:34)  HR: 67 (21 Jun 2021 05:17) (67 - 98)  BP: 139/51 (21 Jun 2021 05:17) (103/59 - 139/51)  BP(mean): --  RR: 17 (21 Jun 2021 05:17) (16 - 17)  SpO2: 93% (21 Jun 2021 05:17) (91% - 93%)    Physical Exam  Gen: NAD, confused  RLE:   Dressing clean/dry/intact  abduction pillow in place  + EHL/FHL/TA/GSC   SILT L2-S1   DP Intact   No calf ttp  Compartments soft    A/P:  89y Male sp R hip maxime POD# 4  Pain control  DVT ppx, Lovenox  PT/WBAT/Posterior hip precautions/abduction pillow/OOB  Please keep abduction pillow in place as patient is demented and will not follow posterior hip precautions  FU labs  Medical management appreciated  Incentive spirometry  Dispo planning, SAV  Ortho stable for discharge

## 2021-06-22 LAB
ANION GAP SERPL CALC-SCNC: 9 MMOL/L — SIGNIFICANT CHANGE UP (ref 5–17)
BLD GP AB SCN SERPL QL: SIGNIFICANT CHANGE UP
BUN SERPL-MCNC: 39 MG/DL — HIGH (ref 7–23)
CALCIUM SERPL-MCNC: 8.6 MG/DL — SIGNIFICANT CHANGE UP (ref 8.5–10.1)
CHLORIDE SERPL-SCNC: 111 MMOL/L — HIGH (ref 96–108)
CO2 SERPL-SCNC: 26 MMOL/L — SIGNIFICANT CHANGE UP (ref 22–31)
CREAT SERPL-MCNC: 1.5 MG/DL — HIGH (ref 0.5–1.3)
FERRITIN SERPL-MCNC: 423 NG/ML — HIGH (ref 30–400)
FOLATE SERPL-MCNC: >20 NG/ML — SIGNIFICANT CHANGE UP
GLUCOSE SERPL-MCNC: 109 MG/DL — HIGH (ref 70–99)
HAPTOGLOB SERPL-MCNC: 319 MG/DL — HIGH (ref 34–200)
HCT VFR BLD CALC: 24.8 % — LOW (ref 39–50)
HGB BLD-MCNC: 8.2 G/DL — LOW (ref 13–17)
IRON SATN MFR SERPL: 14 UG/DL — LOW (ref 45–165)
IRON SATN MFR SERPL: 8 % — LOW (ref 16–55)
MCHC RBC-ENTMCNC: 31.1 PG — SIGNIFICANT CHANGE UP (ref 27–34)
MCHC RBC-ENTMCNC: 33.1 GM/DL — SIGNIFICANT CHANGE UP (ref 32–36)
MCV RBC AUTO: 93.9 FL — SIGNIFICANT CHANGE UP (ref 80–100)
NRBC # BLD: 0 /100 WBCS — SIGNIFICANT CHANGE UP (ref 0–0)
PLATELET # BLD AUTO: 231 K/UL — SIGNIFICANT CHANGE UP (ref 150–400)
POTASSIUM SERPL-MCNC: 3.5 MMOL/L — SIGNIFICANT CHANGE UP (ref 3.5–5.3)
POTASSIUM SERPL-SCNC: 3.5 MMOL/L — SIGNIFICANT CHANGE UP (ref 3.5–5.3)
RBC # BLD: 2.64 M/UL — LOW (ref 4.2–5.8)
RBC # BLD: 2.89 M/UL — LOW (ref 4.2–5.8)
RBC # FLD: 13.4 % — SIGNIFICANT CHANGE UP (ref 10.3–14.5)
RETICS #: 42.8 K/UL — SIGNIFICANT CHANGE UP (ref 25–125)
RETICS/RBC NFR: 1.5 % — SIGNIFICANT CHANGE UP (ref 0.5–2.5)
SODIUM SERPL-SCNC: 146 MMOL/L — HIGH (ref 135–145)
TIBC SERPL-MCNC: 186 UG/DL — LOW (ref 220–430)
UIBC SERPL-MCNC: 171 UG/DL — SIGNIFICANT CHANGE UP (ref 110–370)
VIT B12 SERPL-MCNC: 571 PG/ML — SIGNIFICANT CHANGE UP (ref 232–1245)
WBC # BLD: 4.96 K/UL — SIGNIFICANT CHANGE UP (ref 3.8–10.5)
WBC # FLD AUTO: 4.96 K/UL — SIGNIFICANT CHANGE UP (ref 3.8–10.5)

## 2021-06-22 PROCEDURE — 74176 CT ABD & PELVIS W/O CONTRAST: CPT | Mod: 26

## 2021-06-22 PROCEDURE — 99232 SBSQ HOSP IP/OBS MODERATE 35: CPT

## 2021-06-22 RX ORDER — MAGNESIUM HYDROXIDE 400 MG/1
30 TABLET, CHEWABLE ORAL
Qty: 0 | Refills: 0 | DISCHARGE
Start: 2021-06-22

## 2021-06-22 RX ORDER — TRAMADOL HYDROCHLORIDE 50 MG/1
1 TABLET ORAL
Qty: 0 | Refills: 0 | DISCHARGE
Start: 2021-06-22

## 2021-06-22 RX ORDER — MULTIVIT-MIN/FERROUS GLUCONATE 9 MG/15 ML
1 LIQUID (ML) ORAL
Qty: 0 | Refills: 0 | DISCHARGE
Start: 2021-06-22

## 2021-06-22 RX ORDER — PANTOPRAZOLE SODIUM 20 MG/1
1 TABLET, DELAYED RELEASE ORAL
Qty: 0 | Refills: 0 | DISCHARGE
Start: 2021-06-22

## 2021-06-22 RX ORDER — POLYETHYLENE GLYCOL 3350 17 G/17G
17 POWDER, FOR SOLUTION ORAL
Qty: 0 | Refills: 0 | DISCHARGE
Start: 2021-06-22

## 2021-06-22 RX ORDER — FUROSEMIDE 40 MG
20 TABLET ORAL ONCE
Refills: 0 | Status: COMPLETED | OUTPATIENT
Start: 2021-06-22 | End: 2021-06-22

## 2021-06-22 RX ORDER — ENOXAPARIN SODIUM 100 MG/ML
0 INJECTION SUBCUTANEOUS
Qty: 0 | Refills: 0 | DISCHARGE
Start: 2021-06-22

## 2021-06-22 RX ORDER — MULTIVIT-MIN/FERROUS GLUCONATE 9 MG/15 ML
1 LIQUID (ML) ORAL DAILY
Refills: 0 | Status: CANCELLED | OUTPATIENT
Start: 2021-06-22 | End: 2021-06-23

## 2021-06-22 RX ORDER — DOXAZOSIN MESYLATE 4 MG
4 TABLET ORAL AT BEDTIME
Refills: 0 | Status: DISCONTINUED | OUTPATIENT
Start: 2021-06-22 | End: 2021-06-23

## 2021-06-22 RX ORDER — SENNA PLUS 8.6 MG/1
2 TABLET ORAL
Qty: 0 | Refills: 0 | DISCHARGE
Start: 2021-06-22

## 2021-06-22 RX ORDER — ACETAMINOPHEN 500 MG
650 TABLET ORAL ONCE
Refills: 0 | Status: COMPLETED | OUTPATIENT
Start: 2021-06-22 | End: 2021-06-22

## 2021-06-22 RX ADMIN — Medication 20 MILLIGRAM(S): at 22:24

## 2021-06-22 RX ADMIN — Medication 650 MILLIGRAM(S): at 16:35

## 2021-06-22 RX ADMIN — ENOXAPARIN SODIUM 40 MILLIGRAM(S): 100 INJECTION SUBCUTANEOUS at 12:15

## 2021-06-22 RX ADMIN — POLYETHYLENE GLYCOL 3350 17 GRAM(S): 17 POWDER, FOR SOLUTION ORAL at 22:18

## 2021-06-22 RX ADMIN — FINASTERIDE 5 MILLIGRAM(S): 5 TABLET, FILM COATED ORAL at 12:15

## 2021-06-22 RX ADMIN — ONDANSETRON 4 MILLIGRAM(S): 8 TABLET, FILM COATED ORAL at 20:35

## 2021-06-22 RX ADMIN — Medication 1 MILLIGRAM(S): at 12:15

## 2021-06-22 RX ADMIN — Medication 4 MILLIGRAM(S): at 22:18

## 2021-06-22 RX ADMIN — SIMVASTATIN 10 MILLIGRAM(S): 20 TABLET, FILM COATED ORAL at 22:18

## 2021-06-22 RX ADMIN — PANTOPRAZOLE SODIUM 40 MILLIGRAM(S): 20 TABLET, DELAYED RELEASE ORAL at 06:17

## 2021-06-22 RX ADMIN — Medication 500 MILLIGRAM(S): at 12:15

## 2021-06-22 RX ADMIN — SENNA PLUS 2 TABLET(S): 8.6 TABLET ORAL at 22:18

## 2021-06-22 NOTE — PROGRESS NOTE ADULT - SUBJECTIVE AND OBJECTIVE BOX
89M POD5 s/p right hip hemiarthroplasty. Patient awake, insist in keeping hip flexed despite placement of abduction pillow. Stable overnight. Still with minimal ambulation per PT.    On exam, right hip dressing clean, dry, intact LE's positioned in abduction pillow. Able to spontaneously move ankle/foot. Warm toes.    WBC - 6.02 --> 8.15 --> 5.64 --> 4.96  H&H 10.1/30.9 --> 9.6/29.2 --> 8.9/27.9 --> 8.1/24.5 --> 8.2/24.8    A/P: POD5 s/p right hip hemiarthroplasty.  RAMIRO - appreciate renal consultation; continue IVF  Anemia - appreciate heme-onc recs;   Urinary retention - bean placed; Urology care appreciated  Reg diet  PT/OT - WBAT/Posterior hip precautions  DVT Proph - [id number] BID x30days  Dispo - pending PT/OT status - likely SAV Scott MD  Orthopaedics

## 2021-06-22 NOTE — CONSULT NOTE ADULT - REASON FOR ADMISSION
Acute Altered in mental status

## 2021-06-22 NOTE — CONSULT NOTE ADULT - CONSULT REASON
right femoral neck fracture
cardiac clearance
Anemia
RAMIRO/CKD
Recommendations on rehabilitation, disposition

## 2021-06-22 NOTE — PROGRESS NOTE ADULT - ASSESSMENT
89 yo M with PMH of HTN, HLD, dementia, A-fib, BPH, s/p aortic aneurysm repair with AVR, PPM ([employer]) presented to ED with worsening confusion and fall, found to have R femoral neck fracture. Cardiology consulted for cardiac clearance.     R femoral neck fracture  - S/P R Hip hemiarthroplasty, tolerated procedure well from CV standpoint  - PPM interrogation 06/16/2021 showed remaining longevity 1.5 years, multiple ATR, A paced 68%, V paved 41%  - Echo from 2016, normal LVEF  - No evidence of volume overload  - pain control, PT, hip precautions per ortho  - asa?    PAF  - HR: 68 (22 Jun 2021 13:03) (68 - 87)  - EKG showing NSR with RBBB with signs of intermittent V-pacing  - hrs slightly higher than before, and hb trending down.    Hypertension  - BP has been labile but more stable now BP: 134/66 (22 Jun 2021 13:03) (128/78 - 141/55)  - Not on antihypertensives (other than cardura) and would keep off  - Monitor routine hemodynamics     ?ARMIRO on CKD  - encourage po intake for his RAMIRO as well as  hypernatremia  - renal function improving as per renal may be his baseline  - monitor renal indices    Hyperlipidemia  - Continue Zocor    - Monitor and replete lytes, keep K>4, Mg>2.  - All other medical needs as per primary team.  - Other cardiovascular workup will depend on clinical course.  - Will continue to follow.    Sary Liz, [facility]  Cardiology  Spectra # [phone] 89 yo M with PMH of HTN, HLD, dementia, A-fib, BPH, s/p aortic aneurysm repair with AVR, PPM ([employer]) presented to ED with worsening confusion and fall, found to have R femoral neck fracture. Cardiology consulted for cardiac clearance.     R femoral neck fracture  - S/P R Hip hemiarthroplasty, tolerated procedure well from CV standpoint  - PPM interrogation 06/16/2021 showed remaining longevity 1.5 years, multiple ATR, A paced 68%, V paved 41%  - Echo from 2016, normal LVEF  - No evidence of volume overload  - pain control, PT, hip precautions per ortho  - asa?    PAF  - HR: 68 (22 Jun 2021 13:03) (68 - 87)  - EKG showing NSR with RBBB with signs of intermittent V-pacing  - hrs slightly higher than before, and hb trending down.    Hypertension  - BP has been labile but more stable now BP: 134/66 (22 Jun 2021 13:03) (128/78 - 141/55)  - Not on antihypertensives (other than cardura) and would keep off  - Monitor routine hemodynamics     ?RAMIRO on CKD  - encourage po intake for his RAMIRO as well as  hypernatremia  - renal function improving as per renal may be his baseline  - monitor renal indices    Hyperlipidemia  - Continue Zocor    Anemia  - Hgb trending down  - Hematology following    - Monitor and replete lytes, keep K>4, Mg>2.  - All other medical needs as per primary team.  - Other cardiovascular workup will depend on clinical course.  - Will continue to follow.    Sary Liz, [facility]  Cardiology  Spectra # [phone]

## 2021-06-22 NOTE — CONSULT NOTE ADULT - SUBJECTIVE AND OBJECTIVE BOX
Hematology/Oncology consult     Patient is seen and examined  notes/labs reviewed  pt family is at bedside and d/w family.  consult dictated,  Job #    contact #  son/daughter----  phone #    IMPRESSION:      RECOMMENDATION:              ,thanks for courtsey of this consult,will follow this pt with you for hem/onc issue while pt is in hospital. Hematology/Oncology consult     Patient is seen and examined  notes/labs reviewed  consult dictated,  Job # [id number]    contact #  son----Ambika Granados  phone # [phone]--called, message left  cell--[phone]    Surgical Pathology Report (06.17.21 @ 21:40)    Surgical Pathology Report:   ACCESSION No:  [id number]    FAM GRANADOS                        2        Surgical Final Report          Final Diagnosis  Right hip hemiarthroplasty:  Osteoporotic bone with marrow hemorrhage consistent with an  acute fracture.  Histological changes of degenerative joint disease.    Verified by: Zach Viera MD  (Electronic Signature)  Reported on: 06/21/21 14:44 EDT, [facility], [address] [place] [address]  Phone: [phone]   Fax: [phone]  _________________________________________________________________    Clinical History  Procedure: Right hip hemiarthroplasty    Specimen(s) Submitted  Right femoral head    Gross Description  Received: In formalin labeled "right femoral head"  Integrity: Intact  Size: 4.5 x 4.5 x 4.5 cm femoral head  Resection Margin: Jagged and hemorrhagic  Cartilage: 0.1 to 0.2 cm thick, brown-yellow, smooth with focal  areas showing cartilage loss  Cut Surfaces: Yellow-tan, firm and dense to porous with extensive  hemorrhagealong the base  Additional Comments: Separately received are two pieces of red-  yellow hemorrhagic bony tissue measuring in aggregate 3.5 x 2.2 x  1.0 cm  Submitted: Representative sections are submitted following  fixation and decalcification in two cassettes: 1A-1B  STEFANIE Pascual ([facility]) 06/18/2021 09:41 AM    Perioperative Diagnosis  Right femoral neck fracture displaced    Postoperative Diagnosis  Same    Disclaimer  In addition to other data that may appear on the specimen  containers, alllabels have been inspected to confirm the  presence of the patient's name and date of birth.  Histological processing performed at [facility],  Department of Pathology, [address] [place] [address].              FAM GRANADOS              2        Surgical Consult Report          Disclaimer  In addition to other data that may appear on the specimen  containers, all labels have been inspected to confirm the  presence of the patient's name and date of birth.  Histological processing performed at [facility],  Department of Pathology, [address] [place] [address], [place], [place].        IMPRESSION:  89/m with mulktiple co morbid medical history, hx of cardiac surgery 2011, aortic valve dx, s/p unilateral nephrectomy about 5 years ago, lives at home with aide for 7 hours, follows with dr frank amico, came to er 6/15 after fall at home, noted rt hip fx, admitted 6/15, seen by surgery, s/p rt hip surgery around 6/17, post op drop in hb ( hb was 11.9 on 6/15 and hb 8.1/8.2 on 6/22), patient with symptomatic anemia-lethargy, renal insufficiency and nephrology is following and is on hydration as per renal with possible further drop in hb is possible with hydration, already requested 1 prbc by dr glynn and hem/onc consulted for anemia    RECOMMENDATION:  anemia--likely multifactorial  will initiate anemia work up to exclude treatable etiologies  1 prbc already requested for symptomatic anemia--iv lasix 20 mg post prbc transfusion to avoid volume overload  h/o unilateral nephrectomy--per son no hx of malignancy, no medical records, requested ct a/p--no contrast  smear requested--will review when ready  rt hip fx--s/p rt hip surgery  gi/dvtp per ortho  case d/w son ambika, in agreement      Dr Glynn ,thanks for courtesy of this consult, will follow this pt with you for hem/onc issue while pt is in hospital.

## 2021-06-22 NOTE — PROGRESS NOTE ADULT - SUBJECTIVE AND OBJECTIVE BOX
Patient is a 89y old  Male who presents with a chief complaint of Acute Altered in mental status (16 Jun 2021 07:45)       HPI:  Chart and labs reviewed.  FAM GRANADOS is a 89y Male presented to the ED from home with son for generalized weakness and worsening confusion and a fall on Monday morning around 3 am. At baseline patient ambulates with walker. Son explains they have cameras in the house and saw him trip and fall Monday morning. Since then patient has been more confused than baseline and unable ambulate. Patient unable to provide history due to dementia. As per son, no sob, vomiting or diarrhea. no blood thinners. Patient currently takes no medications daily.     Renal is being consulted for elevated BUN/Cr. Patient is a very poor historian. No other specific h/o. Unknown baseline renal function     Follow up acute on CKD Stage 3  No acute events noted       (15 Nigel 2021 22:44)       PAST MEDICAL & SURGICAL HISTORY:  HTN (hypertension)    HLD (hyperlipidemia)    h/o Aortic aneurysm    h/o TIA (transient ischemic attack) 6 yrs ago    h/o Disc prolapse Lumbar    h/o Symptomatic Bradycardia    h/o  ? Atrial fibrillation    Hyperlipidemia    Benign essential hypertension    Aortic valve disorder    H/O unilateral nephrectomy    Aortic prosthetic valve regurgitation    Artificial pacemaker    Calculus of ureter  2/2015    h/o Lumbar laminectomy    Pacemaker  Oct 2011    s/p Aortic aneurysm repair  10/25/2011    Aortic valve replacement Oct 2011    Patent foramen ovale repair         FAMILY HISTORY:  No pertinent family history in first degree relatives    [place]    Social History:Non smoker    MEDICATIONS  (STANDING):  ascorbic acid 500 milliGRAM(s) Oral daily  finasteride 5 milliGRAM(s) Oral daily  folic acid 1 milliGRAM(s) Oral daily  lactated ringers. 1000 milliLiter(s) (75 mL/Hr) IV Continuous <Continuous>  simvastatin 10 milliGRAM(s) Oral at bedtime    MEDICATIONS  (PRN):  acetaminophen   Tablet .. 650 milliGRAM(s) Oral every 6 hours PRN Mild Pain (1 - 3)  meclizine 12.5 milliGRAM(s) Oral three times a day PRN Dizziness  morphine  - Injectable 2 milliGRAM(s) IV Push every 4 hours PRN Severe Pain (7 - 10)      Allergies    shellfish (Rash)  Sulfa drugs - Rash (Rash)  sulfa drugs (Hives)    Intolerances         REVIEW OF SYSTEMS: NA. Confused    Vital Signs Last 24 Hrs  T(C): 36.3 (22 Jun 2021 05:30), Max: 36.9 (21 Jun 2021 13:02)  T(F): 97.3 (22 Jun 2021 05:30), Max: 98.4 (21 Jun 2021 13:02)  HR: 87 (22 Jun 2021 05:30) (75 - 91)  BP: 141/55 (22 Jun 2021 05:30) (110/59 - 141/55)  BP(mean): --  RR: 18 (22 Jun 2021 05:30) (18 - 18)  SpO2: 92% (22 Jun 2021 05:30) (91% - 93%)      PHYSICAL EXAM:    GENERAL: No distress; dry oral mucosa  HEAD:  Atraumatic, Normocephalic  NECK: Supple  NERVOUS SYSTEM: Confused  CHEST/LUNG: Clear to percussion bilaterally; No rales, rhonchi, wheezing, or rubs  HEART: Regular rate and rhythm; No murmurs, rubs, or gallops  ABDOMEN: Soft, Nontender, Nondistended; Bowel sounds present  EXTREMITIES:  No Edema  SKIN: No rashes No obvious ecchymosis      LABS:                                                      8.1    x     )-----------( x        ( 21 Jun 2021 18:19 )             24.5     06-21    143  |  110<H>  |  39<H>  ----------------------------<  102<H>  3.6   |  28  |  1.50<H>    Ca    8.3<L>      21 Jun 2021 14:31    TPro  5.9<L>  /  Alb  2.3<L>  /  TBili  0.7  /  DBili  x   /  AST  101<H>  /  ALT  39  /  AlkPhos  141<H>  06-21

## 2021-06-22 NOTE — CHART NOTE - NSCHARTNOTEFT_GEN_A_CORE
Do you have Advance Directives (HCP / LV / Organ donation / Documentation of oral advance Directive):   ( x   )  yes    (      )    NO                                                                            Do you have LV - Living will :                                                                                                                                             (  x  )  yes    (      )   No    Do you have HCP - Health Care Proxy:                                                                                                                            (   x  )  yes   (       ) N0    Do you have DNR- Do Not Resuscitate :                                                                                                                           (  x    )  yes  (        )  No    Do you have DNI- Do Not intubate  :                                                                                                                               (   x   )  yes   (       ) No    Do you have MOLST - Medical orders for Life sustaining treatment  :                                                                    x) yes    (       ) No    Decision Maker :  (     ) Patient     (   x   )  HCA   (     ) Public Health Law Surrogate     (      ) Surrogate  (       ) Guardian    Goals of Care :  (      )   Complete Care     (       ) No Limitations                              (       )   Comfort Care       (       )  Hospice                               (  x    )   Limited medical Intervention / s    Medical Interventions :   (        )   CPR       (    x    )  DNR                                               (        )  Intubation with MV - Mechanical Ventilation  (      ) BIPAP/CPAP    (     x    )   DNI                                               (         )  Artificial Nutrition -  IVF, TPN / PPN, Tube Feeds             (     x    )   No Feeding Tube                                                (    x    ) Use Antibiotics                         (          ) No Antibiotics                                                (    x     ) Blood and Blood Products     (         )   No Blood or Blood products                                                (          )  Dialysis                                    (      x   )  No Dialysis                                                (          )  Medical Management only  (         )  No Invasive Interventions or Surgery  Time spent :                        (    x   ) upto 30 minutes                       (           )   more than 30 minutes  ACP reviewed and discussed 82.1 82.1

## 2021-06-22 NOTE — CHART NOTE - NSCHARTNOTEFT_GEN_A_CORE
Assessment/Follow up: Pt awake/confused at time of visit; pts son at bedside. Continues on regular diet with small appetite/po intake; 5-25% documented in EMR past 5 days. Pt with poor dentition, missing teeth - will provide soft diet per son request. No report N/V. Fecal incontinence, last BM 6/17 in EMR. Bowel regimen rx. Encourage po fluids/dietary fiber. Will provide prune juice. Food/snack preference obtained through pts son to maximize po intake. Agreeable to chocolate ensure enlive BID. Recommend assistance/encouragement with meals/supplements/fluids.     Factors impacting intake: [ ] none [ ] nausea  [ ] vomiting [ ] diarrhea [x ] constipation  [x ]chewing problems [ ] swallowing issues  [x ] other: confusion     Diet Presciption: Diet, Regular (06-18-21 @ 13:05)    Intake: Poor, 5-25% x 5 days    Current Weight: Weight (kg): 77.1 (06-17 @ 03:25); 147.4lbs(6/20)    Pertinent Medications: MEDICATIONS  (STANDING):  acetaminophen   Tablet .. 650 milliGRAM(s) Oral once  ascorbic acid 500 milliGRAM(s) Oral daily  doxazosin 4 milliGRAM(s) Oral at bedtime  enoxaparin Injectable 40 milliGRAM(s) SubCutaneous every 24 hours  finasteride 5 milliGRAM(s) Oral daily  folic acid 1 milliGRAM(s) Oral daily  furosemide   Injectable 20 milliGRAM(s) IV Push once  pantoprazole    Tablet 40 milliGRAM(s) Oral before breakfast  polyethylene glycol 3350 17 Gram(s) Oral at bedtime  senna 2 Tablet(s) Oral at bedtime  simvastatin 10 milliGRAM(s) Oral at bedtime    MEDICATIONS  (PRN):  acetaminophen   Tablet .. 650 milliGRAM(s) Oral every 6 hours PRN Mild Pain (1 - 3)  bisacodyl Suppository 10 milliGRAM(s) Rectal once PRN Constipation  magnesium hydroxide Suspension 30 milliLiter(s) Oral daily PRN Constipation  meclizine 12.5 milliGRAM(s) Oral three times a day PRN Dizziness  morphine  - Injectable 2 milliGRAM(s) IV Push every 4 hours PRN Severe Pain (7 - 10)  ondansetron Injectable 4 milliGRAM(s) IV Push every 6 hours PRN Nausea and/or Vomiting  traMADol 50 milliGRAM(s) Oral every 6 hours PRN Moderate Pain (4 - 6)    Pertinent Labs: 06-22 Na146 mmol/L<H> Glu 109 mg/dL<H> K+ 3.5 mmol/L Cr  1.50 mg/dL<H> BUN 39 mg/dL<H> 06-19 Phos 2.8 mg/dL 06-21 Alb 2.3 g/dL<L>     CAPILLARY BLOOD GLUCOSE        Skin: right hip surgical incision, sacrum DTI, left buttocks pressure ulcer. Jesus 12.     Estimated Needs:   [x ] no change since previous assessment  [ ] recalculated:     Previous Nutrition Diagnosis:   [ ] Inadequate Energy Intake [ ]Inadequate Oral Intake [ ] Excessive Energy Intake   [ ] Underweight [x ] Increased Nutrient Needs [ ] Overweight/Obesity   [ ] Altered GI Function [ ] Unintended Weight Loss [ ] Food & Nutrition Related Knowledge Deficit [ ] Malnutrition     Nutrition Diagnosis is [x ] ongoing  [ ] resolved [ ] not applicable     New Nutrition Diagnosis: [ x] not applicable       Interventions:   Recommend  [x ] Change Diet To: Soft   [x ] Nutrition Supplement: Ensure enlive 8oz po BID (chocolate)  [ ] Nutrition Support  [x ] Other: MVI with minerals daily.     Monitoring and Evaluation:   [ x] PO intake [ x ] Tolerance to diet prescription [ x ] weights [ x ] labs[ x ] follow up per protocol  [ ] other: Assessment/Follow up: Pt awake/confused at time of visit; pts son at bedside. Continues on regular diet with small appetite/po intake; 5-25% documented in EMR past 5 days. Pt with poor dentition, missing teeth - will provide soft diet per son request. No report N/V. Fecal incontinence, last BM 6/17 in EMR. Bowel regimen rx. Encourage po fluids/dietary fiber. Will provide prune juice. Food/snack preference obtained through pts son to maximize po intake. Agreeable to chocolate ensure enlive BID. Recommend assistance/encouragement with meals/supplements/fluids. Per MOLST pt DNR/DNI/No artificial nutrition.     Factors impacting intake: [ ] none [ ] nausea  [ ] vomiting [ ] diarrhea [x ] constipation  [x ]chewing problems [ ] swallowing issues  [x ] other: confusion     Diet Presciption: Diet, Regular (06-18-21 @ 13:05)    Intake: Poor, 5-25% x 5 days    Current Weight: Weight (kg): 77.1 (06-17 @ 03:25); 147.4lbs(6/20)    Pertinent Medications: MEDICATIONS  (STANDING):  acetaminophen   Tablet .. 650 milliGRAM(s) Oral once  ascorbic acid 500 milliGRAM(s) Oral daily  doxazosin 4 milliGRAM(s) Oral at bedtime  enoxaparin Injectable 40 milliGRAM(s) SubCutaneous every 24 hours  finasteride 5 milliGRAM(s) Oral daily  folic acid 1 milliGRAM(s) Oral daily  furosemide   Injectable 20 milliGRAM(s) IV Push once  pantoprazole    Tablet 40 milliGRAM(s) Oral before breakfast  polyethylene glycol 3350 17 Gram(s) Oral at bedtime  senna 2 Tablet(s) Oral at bedtime  simvastatin 10 milliGRAM(s) Oral at bedtime    MEDICATIONS  (PRN):  acetaminophen   Tablet .. 650 milliGRAM(s) Oral every 6 hours PRN Mild Pain (1 - 3)  bisacodyl Suppository 10 milliGRAM(s) Rectal once PRN Constipation  magnesium hydroxide Suspension 30 milliLiter(s) Oral daily PRN Constipation  meclizine 12.5 milliGRAM(s) Oral three times a day PRN Dizziness  morphine  - Injectable 2 milliGRAM(s) IV Push every 4 hours PRN Severe Pain (7 - 10)  ondansetron Injectable 4 milliGRAM(s) IV Push every 6 hours PRN Nausea and/or Vomiting  traMADol 50 milliGRAM(s) Oral every 6 hours PRN Moderate Pain (4 - 6)    Pertinent Labs: 06-22 Na146 mmol/L<H> Glu 109 mg/dL<H> K+ 3.5 mmol/L Cr  1.50 mg/dL<H> BUN 39 mg/dL<H> 06-19 Phos 2.8 mg/dL 06-21 Alb 2.3 g/dL<L>     CAPILLARY BLOOD GLUCOSE        Skin: right hip surgical incision, sacrum DTI, left buttocks pressure ulcer. Jesus 12.     Estimated Needs:   [x ] no change since previous assessment  [ ] recalculated:     Previous Nutrition Diagnosis:   [ ] Inadequate Energy Intake [ ]Inadequate Oral Intake [ ] Excessive Energy Intake   [ ] Underweight [x ] Increased Nutrient Needs [ ] Overweight/Obesity   [ ] Altered GI Function [ ] Unintended Weight Loss [ ] Food & Nutrition Related Knowledge Deficit [ ] Malnutrition     Nutrition Diagnosis is [x ] ongoing  [ ] resolved [ ] not applicable     New Nutrition Diagnosis: [ x] not applicable       Interventions:   Recommend  [x ] Change Diet To: Soft   [x ] Nutrition Supplement: Ensure enlive 8oz po BID (chocolate)  [ ] Nutrition Support  [x ] Other: MVI with minerals daily.     Monitoring and Evaluation:   [ x] PO intake [ x ] Tolerance to diet prescription [ x ] weights [ x ] labs[ x ] follow up per protocol  [ ] other:

## 2021-06-22 NOTE — PROGRESS NOTE ADULT - ASSESSMENT
RAMIRO/CKD  Fall/R femoral neck fracture  S/P R Hip hemiarthroplasty  HTN  Dementia  Hypernatremia    -Unknown baseline renal function; likely Stage 3 given age. +RAMIRO component.   -Checked CPK - normal range  -Urine studies - reviewed   -Renal indices are stable with a cr ranging 1.3-1.6; baseline likely  -D5W until completion; PO intake encouraged; will continue to monitor  -BP controlled  -Repeat chemistries    Thank you

## 2021-06-22 NOTE — PROGRESS NOTE ADULT - SUBJECTIVE AND OBJECTIVE BOX
Patient is a 89y old  Male who presents with a chief complaint of Acute Altered in mental status (22 Jun 2021 13:51)      INTERVAL /OVERNIGHT EVENTS: more alert and awake    MEDICATIONS  (STANDING):  ascorbic acid 500 milliGRAM(s) Oral daily  doxazosin 4 milliGRAM(s) Oral at bedtime  enoxaparin Injectable 40 milliGRAM(s) SubCutaneous every 24 hours  finasteride 5 milliGRAM(s) Oral daily  folic acid 1 milliGRAM(s) Oral daily  pantoprazole    Tablet 40 milliGRAM(s) Oral before breakfast  polyethylene glycol 3350 17 Gram(s) Oral at bedtime  senna 2 Tablet(s) Oral at bedtime  simvastatin 10 milliGRAM(s) Oral at bedtime    MEDICATIONS  (PRN):  acetaminophen   Tablet .. 650 milliGRAM(s) Oral every 6 hours PRN Mild Pain (1 - 3)  bisacodyl Suppository 10 milliGRAM(s) Rectal once PRN Constipation  magnesium hydroxide Suspension 30 milliLiter(s) Oral daily PRN Constipation  meclizine 12.5 milliGRAM(s) Oral three times a day PRN Dizziness  morphine  - Injectable 2 milliGRAM(s) IV Push every 4 hours PRN Severe Pain (7 - 10)  ondansetron Injectable 4 milliGRAM(s) IV Push every 6 hours PRN Nausea and/or Vomiting  traMADol 50 milliGRAM(s) Oral every 6 hours PRN Moderate Pain (4 - 6)      Allergies    shellfish (Rash)  Sulfa druga - Rash (Rash)  sulfa drugs (Hives)    Intolerances        REVIEW OF SYSTEMS:  unable to obtain    Vital Signs Last 24 Hrs  T(C): 36.6 (22 Jun 2021 13:03), Max: 36.6 (21 Jun 2021 20:55)  T(F): 97.9 (22 Jun 2021 13:03), Max: 97.9 (22 Jun 2021 13:03)  HR: 68 (22 Jun 2021 13:03) (68 - 87)  BP: 134/66 (22 Jun 2021 13:03) (128/78 - 141/55)  BP(mean): --  RR: 18 (22 Jun 2021 13:03) (18 - 18)  SpO2: 92% (22 Jun 2021 13:03) (92% - 93%)    PHYSICAL EXAM:  GENERAL: NAD, well-groomed, well-developed  HEAD:  Atraumatic, Normocephalic  EYES: EOMI, PERRLA, conjunctiva and sclera clear  ENMT: No tonsillar erythema, exudates, or enlargement; Moist mucous membranes, Good dentition, No lesions  NECK: Supple, No JVD, Normal thyroid  NERVOUS SYSTEM:  Alert & awake; Motor Strength 5/5 B/L upper and lower extremities; DTRs 2+ intact and symmetric  CHEST/LUNG: Clear to auscultation bilaterally; No rales, rhonchi, wheezing, or rubs  HEART: Regular rate and rhythm; No murmurs, rubs, or gallops  ABDOMEN: Soft, Nontender, Nondistended; Bowel sounds present  EXTREMITIES:  2+ Peripheral Pulses, No clubbing, cyanosis, or edema  LYMPH: No lymphadenopathy noted  SKIN: No rashes or lesions    LABS:                        8.2    4.96  )-----------( 231      ( 22 Jun 2021 13:09 )             24.8     22 Jun 2021 13:09    146    |  111    |  39     ----------------------------<  109    3.5     |  26     |  1.50     Ca    8.6        22 Jun 2021 13:09    TPro  5.9    /  Alb  2.3    /  TBili  0.7    /  DBili  x      /  AST  101    /  ALT  39     /  AlkPhos  141    21 Jun 2021 14:31        CAPILLARY BLOOD GLUCOSE          RADIOLOGY & ADDITIONAL TESTS:    Notes Reviewed:  x] YES  [ ] NO    Care Discussed with Consultants/Other Providers x] YES  [ ] NO

## 2021-06-22 NOTE — PROGRESS NOTE ADULT - SUBJECTIVE AND OBJECTIVE BOX
[facility] -- Jian Newman, Katrina, Brandy, Rodolfo Cota Savella  Office # [phone]      Follow Up:  Pre and post op optimization    Subjective/Observations: Seen and examined.  Pt sitting in recliner with abductor pillow in place.  He is confused and has emotional outbursts support given with no signs of distress.  Unable to get meaningful history.  NAD.        REVIEW OF SYSTEMS: All other review of systems is negative unless indicated above    PAST MEDICAL & SURGICAL HISTORY:  HTN (hypertension)    HLD (hyperlipidemia)    h/o Aortic aneurysm    h/o TIA (transient ischemic attack) 6 yrs ago    h/o Disc prolapse Lumbar    h/o Symptomatic Bradycardia    h/o  ? Atrial fibrillation    Hyperlipidemia    Benign essential hypertension    Aortic valve disorder    H/O unilateral nephrectomy    Aortic prosthetic valve regurgitation    Artificial pacemaker    Calculus of ureter  2/2015    h/o Lumbar laminectomy    Pacemaker  Oct 2011    s/p Aortic aneurysm repair  10/25/2011    Aortic valve replacement Oct 2011    Patent foramen ovale repair        MEDICATIONS  (STANDING):  ascorbic acid 500 milliGRAM(s) Oral daily  doxazosin 2 milliGRAM(s) Oral at bedtime  enoxaparin Injectable 40 milliGRAM(s) SubCutaneous every 24 hours  finasteride 5 milliGRAM(s) Oral daily  folic acid 1 milliGRAM(s) Oral daily  pantoprazole    Tablet 40 milliGRAM(s) Oral before breakfast  polyethylene glycol 3350 17 Gram(s) Oral at bedtime  senna 2 Tablet(s) Oral at bedtime  simvastatin 10 milliGRAM(s) Oral at bedtime    MEDICATIONS  (PRN):  acetaminophen   Tablet .. 650 milliGRAM(s) Oral every 6 hours PRN Mild Pain (1 - 3)  bisacodyl Suppository 10 milliGRAM(s) Rectal once PRN Constipation  magnesium hydroxide Suspension 30 milliLiter(s) Oral daily PRN Constipation  meclizine 12.5 milliGRAM(s) Oral three times a day PRN Dizziness  morphine  - Injectable 2 milliGRAM(s) IV Push every 4 hours PRN Severe Pain (7 - 10)  ondansetron Injectable 4 milliGRAM(s) IV Push every 6 hours PRN Nausea and/or Vomiting  traMADol 50 milliGRAM(s) Oral every 6 hours PRN Moderate Pain (4 - 6)      Allergies    shellfish (Rash)  Sulfa druga - Rash (Rash)  sulfa drugs (Hives)    Intolerances            Vital Signs Last 24 Hrs  T(C): 36.6 (22 Jun 2021 13:03), Max: 36.6 (21 Jun 2021 20:55)  T(F): 97.9 (22 Jun 2021 13:03), Max: 97.9 (22 Jun 2021 13:03)  HR: 68 (22 Jun 2021 13:03) (68 - 87)  BP: 134/66 (22 Jun 2021 13:03) (128/78 - 141/55)  BP(mean): --  RR: 18 (22 Jun 2021 13:03) (18 - 18)  SpO2: 92% (22 Jun 2021 13:03) (92% - 93%)    I&O's Summary    21 Jun 2021 07:01  -  22 Jun 2021 07:00  --------------------------------------------------------  IN: 0 mL / OUT: 550 mL / NET: -550 mL          PHYSICAL EXAM:  TELE: Not on tele  Constitutional: NAD, awake and alert, well-developed, frail  HEENT: Moist Mucous Membranes, Anicteric  Pulmonary: Non-labored, breath sounds are clear bilaterally, No wheezing, rales or rhonchi  Cardiovascular: Regular, S1 and S2, No murmurs, rubs, gallops or clicks  Gastrointestinal: Bowel Sounds present, soft, nontender.   Lymph: Mild lower extremity edema. No lymphadenopathy.  Skin: No visible rashes or ulcers.    Psych:  Confused    LABS: All Labs Reviewed:                        8.2    4.96  )-----------( 231      ( 22 Jun 2021 13:09 )             24.8                         8.1    x     )-----------( x        ( 21 Jun 2021 18:19 )             24.5                         8.2    5.64  )-----------( 202      ( 21 Jun 2021 14:31 )             25.8     22 Jun 2021 13:09    146    |  111    |  39     ----------------------------<  109    3.5     |  26     |  1.50   21 Jun 2021 14:31    143    |  110    |  39     ----------------------------<  102    3.6     |  28     |  1.50   20 Jun 2021 05:32    143    |  111    |  35     ----------------------------<  119    3.8     |  28     |  1.40     Ca    8.6        22 Jun 2021 13:09  Ca    8.3        21 Jun 2021 14:31  Ca    8.3        20 Jun 2021 05:32    TPro  5.9    /  Alb  2.3    /  TBili  0.7    /  DBili  x      /  AST  101    /  ALT  39     /  AlkPhos  141    21 Jun 2021 14:31    < from: 12 Lead ECG (01.05.15 @ 09:40) >  Ventricular Rate 65 BPM    Atrial Rate 65 BPM    P-R Interval 190 ms    QRS Duration 134 ms    Q-T Interval 436 ms    QTC Calculation(Bezet) 453 ms    P Axis 35 degrees    R Axis 2 degrees    T Axis 49 degrees    Diagnosis Line Normal sinus rhythm  Right bundle branch block  Confirmed by George Layton ([id number]) on 1/5/2015 3:11:05 PM    < end of copied text >      < from: Xray Hip 1 View Intraoperative, Right (06.17.21 @ 22:19) >  EXAM:  XR HIP 1V RT INTRAOP                            PROCEDURE DATE:  06/17/2021          INTERPRETATION:  HISTORY: Hemiarthroplasty    TECHNIQUE: Two views of the RIGHT hip are submitted.    Findings: Evaluation demonstrates both femoral and acetabular components well-seated and in good anatomic alignment.    There is no fracture.    The visualized pelvis is within normal limits.    Impression:  Prosthetic Hip replacement in proper anatomical alignment..                BRONWYN BERRY MD;Attending Radiologist  This document has been electronically signed. Jun 18 2021  2:02PM    < end of copied text >

## 2021-06-23 ENCOUNTER — TRANSCRIPTION ENCOUNTER (OUTPATIENT)
Age: 86
End: 2021-06-23

## 2021-06-23 VITALS
OXYGEN SATURATION: 96 % | DIASTOLIC BLOOD PRESSURE: 54 MMHG | SYSTOLIC BLOOD PRESSURE: 124 MMHG | HEART RATE: 57 BPM | RESPIRATION RATE: 18 BRPM | TEMPERATURE: 98 F

## 2021-06-23 LAB
ANION GAP SERPL CALC-SCNC: 8 MMOL/L — SIGNIFICANT CHANGE UP (ref 5–17)
BASOPHILS # BLD AUTO: 0.02 K/UL — SIGNIFICANT CHANGE UP (ref 0–0.2)
BASOPHILS NFR BLD AUTO: 0.3 % — SIGNIFICANT CHANGE UP (ref 0–2)
BUN SERPL-MCNC: 45 MG/DL — HIGH (ref 7–23)
CALCIUM SERPL-MCNC: 8.6 MG/DL — SIGNIFICANT CHANGE UP (ref 8.5–10.1)
CHLORIDE SERPL-SCNC: 111 MMOL/L — HIGH (ref 96–108)
CO2 SERPL-SCNC: 26 MMOL/L — SIGNIFICANT CHANGE UP (ref 22–31)
CREAT SERPL-MCNC: 1.6 MG/DL — HIGH (ref 0.5–1.3)
EOSINOPHIL # BLD AUTO: 0.09 K/UL — SIGNIFICANT CHANGE UP (ref 0–0.5)
EOSINOPHIL NFR BLD AUTO: 1.5 % — SIGNIFICANT CHANGE UP (ref 0–6)
GLUCOSE SERPL-MCNC: 128 MG/DL — HIGH (ref 70–99)
HCT VFR BLD CALC: 29.5 % — LOW (ref 39–50)
HGB BLD-MCNC: 9.8 G/DL — LOW (ref 13–17)
IMM GRANULOCYTES NFR BLD AUTO: 0.5 % — SIGNIFICANT CHANGE UP (ref 0–1.5)
LYMPHOCYTES # BLD AUTO: 1.03 K/UL — SIGNIFICANT CHANGE UP (ref 1–3.3)
LYMPHOCYTES # BLD AUTO: 17.1 % — SIGNIFICANT CHANGE UP (ref 13–44)
MCHC RBC-ENTMCNC: 30.2 PG — SIGNIFICANT CHANGE UP (ref 27–34)
MCHC RBC-ENTMCNC: 33.2 GM/DL — SIGNIFICANT CHANGE UP (ref 32–36)
MCV RBC AUTO: 91 FL — SIGNIFICANT CHANGE UP (ref 80–100)
MONOCYTES # BLD AUTO: 0.49 K/UL — SIGNIFICANT CHANGE UP (ref 0–0.9)
MONOCYTES NFR BLD AUTO: 8.1 % — SIGNIFICANT CHANGE UP (ref 2–14)
NEUTROPHILS # BLD AUTO: 4.36 K/UL — SIGNIFICANT CHANGE UP (ref 1.8–7.4)
NEUTROPHILS NFR BLD AUTO: 72.5 % — SIGNIFICANT CHANGE UP (ref 43–77)
NRBC # BLD: 0 /100 WBCS — SIGNIFICANT CHANGE UP (ref 0–0)
PHOSPHATE SERPL-MCNC: 2.6 MG/DL — SIGNIFICANT CHANGE UP (ref 2.5–4.5)
PLATELET # BLD AUTO: 250 K/UL — SIGNIFICANT CHANGE UP (ref 150–400)
POTASSIUM SERPL-MCNC: 3.7 MMOL/L — SIGNIFICANT CHANGE UP (ref 3.5–5.3)
POTASSIUM SERPL-SCNC: 3.7 MMOL/L — SIGNIFICANT CHANGE UP (ref 3.5–5.3)
RBC # BLD: 3.24 M/UL — LOW (ref 4.2–5.8)
RBC # FLD: 13.3 % — SIGNIFICANT CHANGE UP (ref 10.3–14.5)
SARS-COV-2 RNA SPEC QL NAA+PROBE: SIGNIFICANT CHANGE UP
SODIUM SERPL-SCNC: 145 MMOL/L — SIGNIFICANT CHANGE UP (ref 135–145)
WBC # BLD: 6.02 K/UL — SIGNIFICANT CHANGE UP (ref 3.8–10.5)
WBC # FLD AUTO: 6.02 K/UL — SIGNIFICANT CHANGE UP (ref 3.8–10.5)

## 2021-06-23 PROCEDURE — 88305 TISSUE EXAM BY PATHOLOGIST: CPT

## 2021-06-23 PROCEDURE — 85027 COMPLETE CBC AUTOMATED: CPT

## 2021-06-23 PROCEDURE — 97116 GAIT TRAINING THERAPY: CPT

## 2021-06-23 PROCEDURE — 83550 IRON BINDING TEST: CPT

## 2021-06-23 PROCEDURE — 73552 X-RAY EXAM OF FEMUR 2/>: CPT

## 2021-06-23 PROCEDURE — 76376 3D RENDER W/INTRP POSTPROCES: CPT

## 2021-06-23 PROCEDURE — 83036 HEMOGLOBIN GLYCOSYLATED A1C: CPT

## 2021-06-23 PROCEDURE — 81001 URINALYSIS AUTO W/SCOPE: CPT

## 2021-06-23 PROCEDURE — 88311 DECALCIFY TISSUE: CPT

## 2021-06-23 PROCEDURE — 83935 ASSAY OF URINE OSMOLALITY: CPT

## 2021-06-23 PROCEDURE — 80048 BASIC METABOLIC PNL TOTAL CA: CPT

## 2021-06-23 PROCEDURE — 82746 ASSAY OF FOLIC ACID SERUM: CPT

## 2021-06-23 PROCEDURE — 70450 CT HEAD/BRAIN W/O DYE: CPT

## 2021-06-23 PROCEDURE — 97530 THERAPEUTIC ACTIVITIES: CPT

## 2021-06-23 PROCEDURE — 74176 CT ABD & PELVIS W/O CONTRAST: CPT

## 2021-06-23 PROCEDURE — 86922 COMPATIBILITY TEST ANTIGLOB: CPT

## 2021-06-23 PROCEDURE — 80053 COMPREHEN METABOLIC PANEL: CPT

## 2021-06-23 PROCEDURE — 99232 SBSQ HOSP IP/OBS MODERATE 35: CPT

## 2021-06-23 PROCEDURE — 85045 AUTOMATED RETICULOCYTE COUNT: CPT

## 2021-06-23 PROCEDURE — 87086 URINE CULTURE/COLONY COUNT: CPT

## 2021-06-23 PROCEDURE — 82962 GLUCOSE BLOOD TEST: CPT

## 2021-06-23 PROCEDURE — 97166 OT EVAL MOD COMPLEX 45 MIN: CPT

## 2021-06-23 PROCEDURE — 36430 TRANSFUSION BLD/BLD COMPNT: CPT

## 2021-06-23 PROCEDURE — C1776: CPT

## 2021-06-23 PROCEDURE — 83010 ASSAY OF HAPTOGLOBIN QUANT: CPT

## 2021-06-23 PROCEDURE — 84443 ASSAY THYROID STIM HORMONE: CPT

## 2021-06-23 PROCEDURE — 73501 X-RAY EXAM HIP UNI 1 VIEW: CPT

## 2021-06-23 PROCEDURE — 71045 X-RAY EXAM CHEST 1 VIEW: CPT

## 2021-06-23 PROCEDURE — 85610 PROTHROMBIN TIME: CPT

## 2021-06-23 PROCEDURE — 97110 THERAPEUTIC EXERCISES: CPT

## 2021-06-23 PROCEDURE — 72170 X-RAY EXAM OF PELVIS: CPT

## 2021-06-23 PROCEDURE — P9016: CPT

## 2021-06-23 PROCEDURE — 84133 ASSAY OF URINE POTASSIUM: CPT

## 2021-06-23 PROCEDURE — 83735 ASSAY OF MAGNESIUM: CPT

## 2021-06-23 PROCEDURE — 97535 SELF CARE MNGMENT TRAINING: CPT

## 2021-06-23 PROCEDURE — 86850 RBC ANTIBODY SCREEN: CPT

## 2021-06-23 PROCEDURE — 84156 ASSAY OF PROTEIN URINE: CPT

## 2021-06-23 PROCEDURE — 84300 ASSAY OF URINE SODIUM: CPT

## 2021-06-23 PROCEDURE — 72192 CT PELVIS W/O DYE: CPT

## 2021-06-23 PROCEDURE — U0005: CPT

## 2021-06-23 PROCEDURE — 36415 COLL VENOUS BLD VENIPUNCTURE: CPT

## 2021-06-23 PROCEDURE — 85014 HEMATOCRIT: CPT

## 2021-06-23 PROCEDURE — 83540 ASSAY OF IRON: CPT

## 2021-06-23 PROCEDURE — 82607 VITAMIN B-12: CPT

## 2021-06-23 PROCEDURE — 82550 ASSAY OF CK (CPK): CPT

## 2021-06-23 PROCEDURE — 73502 X-RAY EXAM HIP UNI 2-3 VIEWS: CPT

## 2021-06-23 PROCEDURE — 85018 HEMOGLOBIN: CPT

## 2021-06-23 PROCEDURE — 85025 COMPLETE CBC W/AUTO DIFF WBC: CPT

## 2021-06-23 PROCEDURE — 82570 ASSAY OF URINE CREATININE: CPT

## 2021-06-23 PROCEDURE — 82728 ASSAY OF FERRITIN: CPT

## 2021-06-23 PROCEDURE — 85730 THROMBOPLASTIN TIME PARTIAL: CPT

## 2021-06-23 PROCEDURE — 97162 PT EVAL MOD COMPLEX 30 MIN: CPT

## 2021-06-23 PROCEDURE — 99285 EMERGENCY DEPT VISIT HI MDM: CPT | Mod: 25

## 2021-06-23 PROCEDURE — 86769 SARS-COV-2 COVID-19 ANTIBODY: CPT

## 2021-06-23 PROCEDURE — 86900 BLOOD TYPING SEROLOGIC ABO: CPT

## 2021-06-23 PROCEDURE — U0003: CPT

## 2021-06-23 PROCEDURE — 84100 ASSAY OF PHOSPHORUS: CPT

## 2021-06-23 PROCEDURE — 86901 BLOOD TYPING SEROLOGIC RH(D): CPT

## 2021-06-23 RX ORDER — FERROUS SULFATE 325(65) MG
325 TABLET ORAL DAILY
Refills: 0 | Status: DISCONTINUED | OUTPATIENT
Start: 2021-06-23 | End: 2021-06-23

## 2021-06-23 RX ORDER — POLYETHYLENE GLYCOL 3350 17 G/17G
17 POWDER, FOR SOLUTION ORAL ONCE
Refills: 0 | Status: COMPLETED | OUTPATIENT
Start: 2021-06-23 | End: 2021-06-23

## 2021-06-23 RX ADMIN — FINASTERIDE 5 MILLIGRAM(S): 5 TABLET, FILM COATED ORAL at 12:20

## 2021-06-23 RX ADMIN — Medication 500 MILLIGRAM(S): at 12:20

## 2021-06-23 RX ADMIN — ENOXAPARIN SODIUM 40 MILLIGRAM(S): 100 INJECTION SUBCUTANEOUS at 10:53

## 2021-06-23 RX ADMIN — Medication 10 MILLIGRAM(S): at 10:53

## 2021-06-23 RX ADMIN — POLYETHYLENE GLYCOL 3350 17 GRAM(S): 17 POWDER, FOR SOLUTION ORAL at 10:52

## 2021-06-23 RX ADMIN — Medication 1 MILLIGRAM(S): at 12:20

## 2021-06-23 RX ADMIN — PANTOPRAZOLE SODIUM 40 MILLIGRAM(S): 20 TABLET, DELAYED RELEASE ORAL at 05:36

## 2021-06-23 NOTE — PROGRESS NOTE ADULT - PROBLEM SELECTOR PROBLEM 3
Hip fracture, right

## 2021-06-23 NOTE — PROGRESS NOTE ADULT - ASSESSMENT
contact #  son----Ambika Hallman  phone # [phone]--called, message left  cell--[phone]  IMPRESSION:  89/m with mulktiple co morbid medical history, hx of cardiac surgery 2011, aortic valve dx, s/p unilateral nephrectomy about 5 years ago, lives at home with aide for 7 hours, follows with dr frank amico, came to er 6/15 after fall at home, noted rt hip fx, admitted 6/15, seen by surgery, s/p rt hip surgery around 6/17, post op drop in hb ( hb was 11.9 on 6/15 and hb 8.1/8.2 on 6/22), patient with symptomatic anemia-lethargy, renal insufficiency and nephrology is following and is on hydration as per renal with possible further drop in hb is possible with hydration, already requested 1 prbc by dr glynn and hem/onc consulted for anemia    RECOMMENDATION:  Anemia--likely multifactorial. post op  s/p 1 prbc 6/22/21  iron panel---low iron/transferrin saturation, elevated ferritin  low retic, nl haptoglobin --no evidence of hemolysis  h/o unilateral nephrectomy--per son no hx of malignancy, no medical records, underwent ct a/p---biliary duct dilatation, interval enlargement of mass at head/uncinate process pancrease---i called and d/w son ambika at [phone], son was not aware of prior pancreas mass, i d/w son (hcp) ct findings, advised him further test/work up as malignancy is a concern, considering his age as well as family is not going for any aggressive work up pt son refuses any further test (radio scan/labs/gi evalaution etc) , son refuses any further work up  smear requested--will review when ready  rt hip fx--s/p rt hip surgery  gi/dvtp per ortho  case d/w son ambika in detail, in agreement   contact #  son----Ambika Hallman  phone # [phone]--called, message left  cell--[phone]  IMPRESSION:  89/m with mulktiple co morbid medical history, hx of cardiac surgery 2011, aortic valve dx, s/p unilateral nephrectomy about 5 years ago, lives at home with aide for 7 hours, follows with dr frank amico, came to er 6/15 after fall at home, noted rt hip fx, admitted 6/15, seen by surgery, s/p rt hip surgery around 6/17, post op drop in hb ( hb was 11.9 on 6/15 and hb 8.1/8.2 on 6/22), patient with symptomatic anemia-lethargy, renal insufficiency and nephrology is following and is on hydration as per renal with possible further drop in hb is possible with hydration, already requested 1 prbc by dr glynn and hem/onc consulted for anemia    RECOMMENDATION:  Anemia--likely multifactorial. post op  s/p 1 prbc 6/22/21  iron panel---low iron/transferrin saturation, low uibc/tibc, elevated ferritin-  low retic, nl haptoglobin --no evidence of hemolysis  bm asp and bx, r;b ratio discussed, pt hcp/son refuses  h/o unilateral nephrectomy--per son no hx of malignancy, no medical records, underwent ct a/p---biliary duct dilatation, interval enlargement of mass at head/uncinate process pancrease---i called and d/w son ambika at [phone], son was not aware of prior pancreas mass, i d/w son (hcp) ct findings, advised him further test/work up as malignancy is a concern, considering his age as well as family is not going for any aggressive work up pt son refuses any further test (radio scan/labs/gi evalaution etc) , son refuses any further work up  smear requested--will review when ready  rt hip fx--s/p rt hip surgery  gi/dvtp per ortho  case d/w son ambika in detail, in agreement

## 2021-06-23 NOTE — SWALLOW BEDSIDE ASSESSMENT ADULT - COMMENTS
Upon arrival, pt attempting to climb out of bed and pulling at lines. Pt was repositioned upright in bed with assistance from nursing assistant. Pt then became increasingly more irritable and emotional, continuing to pull at lines and pushing SLP away. Pt was not redirectable with cues of encouragement. Pt was unable to follow low level directives and was not receptive to PO trials.  RN reported pt has been tolerating current diet w/o difficulty, though with poor PO intake. PO intake reportedly improves when pt's son is present and administering PO trials.    CT abdomen 6/22: "LOWER CHEST: Sternotomy. Cardiac pacemaker leads. Aortic valve repair. Bibasilar atelectatic changes with trace left-sided pleural effusion." Pt's WBC is WFL, no fever.

## 2021-06-23 NOTE — PROGRESS NOTE ADULT - SUBJECTIVE AND OBJECTIVE BOX
Patient is a 89y old  Male who presents with a chief complaint of Acute Altered in mental status (23 Jun 2021 10:09)       Pt is seen and examined  pt is awake and lying in bed/out of bed to chair  pt seems comfortable and denies any complaints at this time    HPI:  Chart and labs reviewed.  FAM GRANADOS is a 89y Male presented to the ED from home with son for generalized weakness and worsening confusion and a fall on Monday morning around 3 am. At baseline patient ambulates with walker. Son explains they have cameras in the house and saw him trip and fall Monday morning. Since then patient has been more confused than baseline and unable ambulate. Patient unable to provide history due to dementia. As per son, no sob, vomiting or diarrhea. no blood thinners. Patient currently takes no medications daily.        (15 Nigel 2021 22:44)         ROS:  Negative except for:    MEDICATIONS  (STANDING):  ascorbic acid 500 milliGRAM(s) Oral daily  doxazosin 4 milliGRAM(s) Oral at bedtime  enoxaparin Injectable 40 milliGRAM(s) SubCutaneous every 24 hours  finasteride 5 milliGRAM(s) Oral daily  folic acid 1 milliGRAM(s) Oral daily  pantoprazole    Tablet 40 milliGRAM(s) Oral before breakfast  polyethylene glycol 3350 17 Gram(s) Oral once  polyethylene glycol 3350 17 Gram(s) Oral at bedtime  senna 2 Tablet(s) Oral at bedtime  simvastatin 10 milliGRAM(s) Oral at bedtime    MEDICATIONS  (PRN):  acetaminophen   Tablet .. 650 milliGRAM(s) Oral every 6 hours PRN Mild Pain (1 - 3)  bisacodyl Suppository 10 milliGRAM(s) Rectal once PRN Constipation  magnesium hydroxide Suspension 30 milliLiter(s) Oral daily PRN Constipation  meclizine 12.5 milliGRAM(s) Oral three times a day PRN Dizziness  morphine  - Injectable 2 milliGRAM(s) IV Push every 4 hours PRN Severe Pain (7 - 10)  ondansetron Injectable 4 milliGRAM(s) IV Push every 6 hours PRN Nausea and/or Vomiting  traMADol 50 milliGRAM(s) Oral every 6 hours PRN Moderate Pain (4 - 6)      Allergies    shellfish (Rash)  Sulfa druga - Rash (Rash)  sulfa drugs (Hives)    Intolerances        Vital Signs Last 24 Hrs  T(C): 36.8 (23 Jun 2021 05:05), Max: 36.8 (22 Jun 2021 20:42)  T(F): 98.3 (23 Jun 2021 05:05), Max: 98.3 (23 Jun 2021 05:05)  HR: 82 (23 Jun 2021 05:05) (68 - 87)  BP: 112/52 (23 Jun 2021 05:05) (112/52 - 134/66)  BP(mean): --  RR: 18 (23 Jun 2021 05:05) (17 - 18)  SpO2: 93% (23 Jun 2021 05:05) (91% - 94%)    PHYSICAL EXAM  General: adult in NAD  HEENT: clear oropharynx, anicteric sclera, pink conjunctiva  Neck: supple  CV: normal S1/S2 with no murmur rubs or gallops  Lungs: positive air movement b/l ant lungs,clear to auscultation, no wheezes, no rales  Abdomen: soft non-tender non-distended, no hepatosplenomegaly  Ext: no clubbing cyanosis or edema  Skin: no rashes and no petechiae  Neuro: alert and oriented X 4, no focal deficits  LABS:                          9.8    6.02  )-----------( 250      ( 23 Jun 2021 07:47 )             29.5         Mean Cell Volume : 91.0 fl  Mean Cell Hemoglobin : 30.2 pg  Mean Cell Hemoglobin Concentration : 33.2 gm/dL  Auto Neutrophil # : 4.36 K/uL  Auto Lymphocyte # : 1.03 K/uL  Auto Monocyte # : 0.49 K/uL  Auto Eosinophil # : 0.09 K/uL  Auto Basophil # : 0.02 K/uL  Auto Neutrophil % : 72.5 %  Auto Lymphocyte % : 17.1 %  Auto Monocyte % : 8.1 %  Auto Eosinophil % : 1.5 %  Auto Basophil % : 0.3 %    Serial CBC's  06-23 @ 07:47  Hct-29.5 / Hgb-9.8 / Plat-250 / RBC-3.24 / WBC-6.02          Serial CBC's  06-22 @ 14:46  Hct--- / Hgb--- / Plat--- / RBC-2.89 / WBC---          Serial CBC's  06-22 @ 13:09  Hct-24.8 / Hgb-8.2 / Plat-231 / RBC-2.64 / WBC-4.96          Serial CBC's  06-21 @ 18:19  Hct-24.5 / Hgb-8.1 / Plat--- / RBC--- / WBC---          Serial CBC's  06-21 @ 14:31  Hct-25.8 / Hgb-8.2 / Plat-202 / RBC-2.76 / WBC-5.64            06-23    145  |  111<H>  |  45<H>  ----------------------------<  128<H>  3.7   |  26  |  1.60<H>    Ca    8.6      23 Jun 2021 07:47  Phos  2.6     06-23    TPro  5.9<L>  /  Alb  2.3<L>  /  TBili  0.7  /  DBili  x   /  AST  101<H>  /  ALT  39  /  AlkPhos  141<H>  06-21          Iron - Total Binding Capacity.: 186 ug/dL (06-22-21 @ 20:10)  Ferritin, Serum: 423 ng/mL (06-22-21 @ 20:10)  Vitamin B12, Serum: 571 pg/mL (06-22-21 @ 20:10)  Folate, Serum: >20.0 ng/mL (06-22-21 @ 20:10)  Reticulocyte Percent: 1.5 % (06-22-21 @ 14:46)                BLOOD SMEAR INTERPRETATION:       RADIOLOGY & ADDITIONAL STUDIES:     Patient is a 89y old  Male who presents with a chief complaint of Acute Altered in mental status (23 Jun 2021 10:09)       Pt is seen and examined  pt is awake and lying in bed  pt seems comfortable at this time    HPI:  Chart and labs reviewed.  FAM GRANADOS is a 89y Male presented to the ED from home with son for generalized weakness and worsening confusion and a fall on Monday morning around 3 am. At baseline patient ambulates with walker. Son explains they have cameras in the house and saw him trip and fall Monday morning. Since then patient has been more confused than baseline and unable ambulate. Patient unable to provide history due to dementia. As per son, no sob, vomiting or diarrhea. no blood thinners. Patient currently takes no medications daily.        (15 Nigel 2021 22:44)         ROS:  as per hpi    MEDICATIONS  (STANDING):  ascorbic acid 500 milliGRAM(s) Oral daily  doxazosin 4 milliGRAM(s) Oral at bedtime  enoxaparin Injectable 40 milliGRAM(s) SubCutaneous every 24 hours  finasteride 5 milliGRAM(s) Oral daily  folic acid 1 milliGRAM(s) Oral daily  pantoprazole    Tablet 40 milliGRAM(s) Oral before breakfast  polyethylene glycol 3350 17 Gram(s) Oral once  polyethylene glycol 3350 17 Gram(s) Oral at bedtime  senna 2 Tablet(s) Oral at bedtime  simvastatin 10 milliGRAM(s) Oral at bedtime    MEDICATIONS  (PRN):  acetaminophen   Tablet .. 650 milliGRAM(s) Oral every 6 hours PRN Mild Pain (1 - 3)  bisacodyl Suppository 10 milliGRAM(s) Rectal once PRN Constipation  magnesium hydroxide Suspension 30 milliLiter(s) Oral daily PRN Constipation  meclizine 12.5 milliGRAM(s) Oral three times a day PRN Dizziness  morphine  - Injectable 2 milliGRAM(s) IV Push every 4 hours PRN Severe Pain (7 - 10)  ondansetron Injectable 4 milliGRAM(s) IV Push every 6 hours PRN Nausea and/or Vomiting  traMADol 50 milliGRAM(s) Oral every 6 hours PRN Moderate Pain (4 - 6)      Allergies    shellfish (Rash)  Sulfa druga - Rash (Rash)  sulfa drugs (Hives)    Intolerances        Vital Signs Last 24 Hrs  T(C): 36.8 (23 Jun 2021 05:05), Max: 36.8 (22 Jun 2021 20:42)  T(F): 98.3 (23 Jun 2021 05:05), Max: 98.3 (23 Jun 2021 05:05)  HR: 82 (23 Jun 2021 05:05) (68 - 87)  BP: 112/52 (23 Jun 2021 05:05) (112/52 - 134/66)  BP(mean): --  RR: 18 (23 Jun 2021 05:05) (17 - 18)  SpO2: 93% (23 Jun 2021 05:05) (91% - 94%)    PHYSICAL EXAM  General: adult in NAD  HEENT: clear oropharynx, anicteric sclera, pink conjunctiva  Neck: supple  CV: normal S1/S2 with no murmur rubs or gallops  Lungs: positive air movement b/l ant lungs,clear to auscultation, no wheezes, no rales  Abdomen: soft non-tender non-distended, no hepatosplenomegaly  Ext: no clubbing cyanosis or edema  Skin: no rashes and no petechiae  Neuro: alert and oriented X 4, no focal deficits  LABS:                          9.8    6.02  )-----------( 250      ( 23 Jun 2021 07:47 )             29.5         Mean Cell Volume : 91.0 fl  Mean Cell Hemoglobin : 30.2 pg  Mean Cell Hemoglobin Concentration : 33.2 gm/dL  Auto Neutrophil # : 4.36 K/uL  Auto Lymphocyte # : 1.03 K/uL  Auto Monocyte # : 0.49 K/uL  Auto Eosinophil # : 0.09 K/uL  Auto Basophil # : 0.02 K/uL  Auto Neutrophil % : 72.5 %  Auto Lymphocyte % : 17.1 %  Auto Monocyte % : 8.1 %  Auto Eosinophil % : 1.5 %  Auto Basophil % : 0.3 %    Serial CBC's  06-23 @ 07:47  Hct-29.5 / Hgb-9.8 / Plat-250 / RBC-3.24 / WBC-6.02          Serial CBC's  06-22 @ 14:46  Hct--- / Hgb--- / Plat--- / RBC-2.89 / WBC---          Serial CBC's  06-22 @ 13:09  Hct-24.8 / Hgb-8.2 / Plat-231 / RBC-2.64 / WBC-4.96          Serial CBC's  06-21 @ 18:19  Hct-24.5 / Hgb-8.1 / Plat--- / RBC--- / WBC---          Serial CBC's  06-21 @ 14:31  Hct-25.8 / Hgb-8.2 / Plat-202 / RBC-2.76 / WBC-5.64            06-23    145  |  111<H>  |  45<H>  ----------------------------<  128<H>  3.7   |  26  |  1.60<H>    Ca    8.6      23 Jun 2021 07:47  Phos  2.6     06-23    TPro  5.9<L>  /  Alb  2.3<L>  /  TBili  0.7  /  DBili  x   /  AST  101<H>  /  ALT  39  /  AlkPhos  141<H>  06-21          Iron - Total Binding Capacity.: 186 ug/dL (06-22-21 @ 20:10)  Ferritin, Serum: 423 ng/mL (06-22-21 @ 20:10)  Vitamin B12, Serum: 571 pg/mL (06-22-21 @ 20:10)  Folate, Serum: >20.0 ng/mL (06-22-21 @ 20:10)  Reticulocyte Percent: 1.5 % (06-22-21 @ 14:46)  Haptoglobin, Serum (06.22.21 @ 20:10)    Haptoglobin, Serum: 319 mg/dL    Iron with Total Binding Capacity (06.22.21 @ 20:10)    Iron - Total Binding Capacity.: 186 ug/dL    % Saturation, Iron: 8 %    Iron Total, Serum: 14 ug/dL    Unsaturated Iron Binding Capacity: 171 ug/dL                  BLOOD SMEAR INTERPRETATION: Normal WBC series and morphology, no apparent blast cells or immature wbc, no schistocytes or fragmented rbc, platelets are about per field, no clumping of platelets or giant platelets.        RADIOLOGY & ADDITIONAL STUDIES:    < from: CT Abdomen and Pelvis No Cont (06.22.21 @ 15:19) >    EXAM:  CT ABDOMEN AND PELVIS                            PROCEDURE DATE:  06/22/2021          INTERPRETATION:  CLINICAL INFORMATION: Anemia, status post right hip surgery for fracture.    COMPARISON: CT scan pelvis 6/15/2021 and CT scan abdomen pelvis 1/5/2015.    CONTRAST/COMPLICATIONS:  IV Contrast: NONE  0 cc administered   0 cc discarded  Oral Contrast: NONE  Complications: None reported at time of study completion    PROCEDURE:  CT of the Abdomen and Pelvis was performed.  Sagittal and coronal reformats were performed.    FINDINGS:    LOWER CHEST: Sternotomy. Cardiac pacemaker leads.  Aortic valve repair.  Bibasilar atelectatic changes with trace left-sided pleural effusion.    Streak artifact degrades image quality limiting evaluation.    The evaluation of the solid organ parenchyma is limited without intravenous contrast.    LIVER: Within normal limits.  BILE DUCTS: Dilated intra and extrahepatic biliary ducts with common bile duct distended to 1.4 cm.  GALLBLADDER: Within normal limits.  SPLEEN: Within normal limits.  PANCREAS: 5.0 x 3.5 cm mass head/uncinate process pancreas, enlarged from prior exam. Mild downstream pancreatic ductal dilatation.  ADRENALS: Within normal limits.  KIDNEYS/URETERS: Prior right nephrectomy.  Multiple left-sided renal cysts.  6 mm nonobstructing intrarenal calcification lower pole left kidney.  No hydronephrosis.    Metallic streak artifact from patient's right hip arthroplasty degrades image quality limiting evaluation of the pelvis.    BLADDER: Within normal limits.  REPRODUCTIVE ORGANS: Coarse central prostate calcifications.    BOWEL: Diffuse colonic fecal retention with distended rectum with fecal impaction.  PERITONEUM: No ascites.  No localized intra-abdominal fluid collection or pneumoperitoneum noted.    VESSELS: Atherosclerotic calcification.  RETROPERITONEUM/LYMPH NODES: No lymphadenopathy.  No retroperitoneal hematoma noted.    ABDOMINAL WALL:  Postoperative changes, including punctate foci of air within the right glutealmusculature. Approximately 2.2 x 4.9 cm hematoma within the right posterior lateral subcutaneous soft tissues.    BONES: Status post right hemiarthroplasty partially imaged.    Degenerative changes spine.    Old right-sided rib fracture deformities.    IMPRESSION:    Status post right hemiarthroplasty.    Small subcutaneous hematoma right posterior lateral subcutaneous soft tissues right buttock/proximal thigh.  No retroperitoneal hematoma noted.    Biliary ductal dilatation, with interval enlargement of mass at the head/uncinate process of the pancreas.  Recommend further clinical correlation.    Other findings as discussed above.              SANDEEP COX MD; Attending Radiologist  This document has been electronically signed. Jun 22 2021  3:53PM    < end of copied text >

## 2021-06-23 NOTE — SWALLOW BEDSIDE ASSESSMENT ADULT - SLP PERTINENT HISTORY OF CURRENT PROBLEM
Per charting, "89 yr old male w dementia, PPM, aortic aneurysm repair, ?afib. adm w right femoral neck fracture"

## 2021-06-23 NOTE — PROGRESS NOTE ADULT - PROBLEM SELECTOR PLAN 1
serial BMP  S/p IVF per renal  renal eval with Dr. GREGORY etal appreciated
serial BMP  S/p IVF per renal  renal eval with Dr. GREGORY etal appreciated
serial BMP  IVF  renal eval with Dr. GREGORY
serial BMP  IVF  renal eval
serial BMP  IVF per renal  renal eval with Dr. GREGORY etal
multifactorial  neuro eval if necessary
serial BMP  IVF  renal eval with Dr. GREGORY

## 2021-06-23 NOTE — PROGRESS NOTE ADULT - SUBJECTIVE AND OBJECTIVE BOX
Patient is a 89y old  Male who presents with a chief complaint of Acute Altered in mental status (16 Jun 2021 07:45)       HPI:  Chart and labs reviewed.  FAM GRANADOS is a 89y Male presented to the ED from home with son for generalized weakness and worsening confusion and a fall on Monday morning around 3 am. At baseline patient ambulates with walker. Son explains they have cameras in the house and saw him trip and fall Monday morning. Since then patient has been more confused than baseline and unable ambulate. Patient unable to provide history due to dementia. As per son, no sob, vomiting or diarrhea. no blood thinners. Patient currently takes no medications daily.     Renal is being consulted for elevated BUN/Cr. Patient is a very poor historian. No other specific h/o. Unknown baseline renal function     Follow up acute on CKD Stage 3  No acute events noted       (15 Nigel 2021 22:44)       PAST MEDICAL & SURGICAL HISTORY:  HTN (hypertension)    HLD (hyperlipidemia)    h/o Aortic aneurysm    h/o TIA (transient ischemic attack) 6 yrs ago    h/o Disc prolapse Lumbar    h/o Symptomatic Bradycardia    h/o  ? Atrial fibrillation    Hyperlipidemia    Benign essential hypertension    Aortic valve disorder    H/O unilateral nephrectomy    Aortic prosthetic valve regurgitation    Artificial pacemaker    Calculus of ureter  2/2015    h/o Lumbar laminectomy    Pacemaker  Oct 2011    s/p Aortic aneurysm repair  10/25/2011    Aortic valve replacement Oct 2011    Patent foramen ovale repair         FAMILY HISTORY:  No pertinent family history in first degree relatives    [place]    Social History:Non smoker    MEDICATIONS  (STANDING):  ascorbic acid 500 milliGRAM(s) Oral daily  finasteride 5 milliGRAM(s) Oral daily  folic acid 1 milliGRAM(s) Oral daily  lactated ringers. 1000 milliLiter(s) (75 mL/Hr) IV Continuous <Continuous>  simvastatin 10 milliGRAM(s) Oral at bedtime    MEDICATIONS  (PRN):  acetaminophen   Tablet .. 650 milliGRAM(s) Oral every 6 hours PRN Mild Pain (1 - 3)  meclizine 12.5 milliGRAM(s) Oral three times a day PRN Dizziness  morphine  - Injectable 2 milliGRAM(s) IV Push every 4 hours PRN Severe Pain (7 - 10)      Allergies    shellfish (Rash)  Sulfa drugs - Rash (Rash)  sulfa drugs (Hives)    Intolerances         REVIEW OF SYSTEMS: NA. Confused    Vital Signs Last 24 Hrs  T(C): 36.8 (23 Jun 2021 05:05), Max: 36.8 (22 Jun 2021 20:42)  T(F): 98.3 (23 Jun 2021 05:05), Max: 98.3 (23 Jun 2021 05:05)  HR: 82 (23 Jun 2021 05:05) (68 - 87)  BP: 112/52 (23 Jun 2021 05:05) (112/52 - 134/66)  BP(mean): --  RR: 18 (23 Jun 2021 05:05) (17 - 18)  SpO2: 93% (23 Jun 2021 05:05) (91% - 94%)    PHYSICAL EXAM:    GENERAL: No distress; dry oral mucosa  HEAD:  Atraumatic, Normocephalic  NECK: Supple  NERVOUS SYSTEM: Confused  CHEST/LUNG: Clear to percussion bilaterally; No rales, rhonchi, wheezing, or rubs  HEART: Regular rate and rhythm; No murmurs, rubs, or gallops  ABDOMEN: Soft, Nontender, Nondistended; Bowel sounds present  EXTREMITIES:  No Edema  SKIN: No rashes No obvious ecchymosis      LABS:                        9.8    6.02  )-----------( 250      ( 23 Jun 2021 07:47 )             29.5     06-23    145  |  111<H>  |  45<H>  ----------------------------<  128<H>  3.7   |  26  |  1.60<H>    Ca    8.6      23 Jun 2021 07:47  Phos  2.6     06-23    TPro  5.9<L>  /  Alb  2.3<L>  /  TBili  0.7  /  DBili  x   /  AST  101<H>  /  ALT  39  /  AlkPhos  141<H>  06-21

## 2021-06-23 NOTE — PROGRESS NOTE ADULT - ASSESSMENT
RAMIRO/CKD  Fall/R femoral neck fracture  S/P R Hip hemiarthroplasty  HTN  Dementia  Hypernatremia    -Unknown baseline renal function; likely Stage 3 given age. +RAMIRO component.   -Checked CPK - normal range  -Urine studies - reviewed   -Renal indices are stable with a cr ranging 1.3-1.6; baseline likely  -S/p IVF; PO intake encouraged; will continue to monitor  -BP controlled  -Ferrous sulfate     Thank you

## 2021-06-23 NOTE — PROGRESS NOTE ADULT - PROBLEM SELECTOR PLAN 3
ortho eval  S/P hip surgery -  hemiarthroplasty
ortho eval  S/P hip surgery yesterday
ortho eval  S/P hip surgery -  hemiarthroplasty
ortho eval  S/P hip surgery -  hemiarthroplasty

## 2021-06-23 NOTE — DISCHARGE NOTE NURSING/CASE MANAGEMENT/SOCIAL WORK - PATIENT PORTAL LINK FT
You can access the FollowMyHealth Patient Portal offered by [facility] by registering at the following website: [email or web address]. By joining [facility]’s FollowMyHealth portal, you will also be able to view your health information using other applications (apps) compatible with our system.

## 2021-06-23 NOTE — PROGRESS NOTE ADULT - SUBJECTIVE AND OBJECTIVE BOX
Patient is a 89y old  Male who presents with a chief complaint of Acute Altered in mental status (23 Jun 2021 09:00)  no distress      INTERVAL HPI/OVERNIGHT EVENTS:  T(C): 36.8 (06-23-21 @ 05:05), Max: 36.8 (06-22-21 @ 20:42)  HR: 82 (06-23-21 @ 05:05) (68 - 87)  BP: 112/52 (06-23-21 @ 05:05) (112/52 - 134/66)  RR: 18 (06-23-21 @ 05:05) (17 - 18)  SpO2: 93% (06-23-21 @ 05:05) (91% - 94%)  Wt(kg): --  I&O's Summary    22 Jun 2021 07:01  -  23 Jun 2021 07:00  --------------------------------------------------------  IN: 306 mL / OUT: 250 mL / NET: 56 mL        LABS:                        9.8    6.02  )-----------( 250      ( 23 Jun 2021 07:47 )             29.5     06-23    145  |  111<H>  |  45<H>  ----------------------------<  128<H>  3.7   |  26  |  1.60<H>    Ca    8.6      23 Jun 2021 07:47  Phos  2.6     06-23    TPro  5.9<L>  /  Alb  2.3<L>  /  TBili  0.7  /  DBili  x   /  AST  101<H>  /  ALT  39  /  AlkPhos  141<H>  06-21        CAPILLARY BLOOD GLUCOSE                MEDICATIONS  (STANDING):  ascorbic acid 500 milliGRAM(s) Oral daily  doxazosin 4 milliGRAM(s) Oral at bedtime  enoxaparin Injectable 40 milliGRAM(s) SubCutaneous every 24 hours  finasteride 5 milliGRAM(s) Oral daily  folic acid 1 milliGRAM(s) Oral daily  pantoprazole    Tablet 40 milliGRAM(s) Oral before breakfast  polyethylene glycol 3350 17 Gram(s) Oral once  polyethylene glycol 3350 17 Gram(s) Oral at bedtime  senna 2 Tablet(s) Oral at bedtime  simvastatin 10 milliGRAM(s) Oral at bedtime    MEDICATIONS  (PRN):  acetaminophen   Tablet .. 650 milliGRAM(s) Oral every 6 hours PRN Mild Pain (1 - 3)  bisacodyl Suppository 10 milliGRAM(s) Rectal once PRN Constipation  magnesium hydroxide Suspension 30 milliLiter(s) Oral daily PRN Constipation  meclizine 12.5 milliGRAM(s) Oral three times a day PRN Dizziness  morphine  - Injectable 2 milliGRAM(s) IV Push every 4 hours PRN Severe Pain (7 - 10)  ondansetron Injectable 4 milliGRAM(s) IV Push every 6 hours PRN Nausea and/or Vomiting  traMADol 50 milliGRAM(s) Oral every 6 hours PRN Moderate Pain (4 - 6)        RADIOLOGY & ADDITIONAL TESTS:    Imaging Personally Reviewed:  [ ] YES  [ ] NO    Consultant(s) Notes Reviewed:  [ ] YES  [ ] NO    PHYSICAL EXAM:  GENERAL: NAD,   HEAD:  Atraumatic, Normocephalic  EYES: EOMI, PERRLA, conjunctiva and sclera clear  ENMT: No tonsillar erythema, exudates, or enlargement; Moist mucous membranes, Good dentition, No lesions  NECK: Supple, No JVD, Normal thyroid  NERVOUS SYSTEM: confused, nonfocal exam  CHEST/LUNG: Clear to percussion bilaterally; No rales, rhonchi, wheezing, or rubs  HEART: Regular rate and rhythm; No murmurs, rubs, or gallops  ABDOMEN: Soft, Nontender, Nondistended; Bowel sounds present  EXTREMITIES:  2+ Peripheral Pulses, No clubbing, cyanosis, or edema  LYMPH: No lymphadenopathy noted  SKIN: No rashes or lesions    Care Discussed with Consultants/Other Providers [ ] YES  [ ] NO

## 2021-06-23 NOTE — PROGRESS NOTE ADULT - PROBLEM SELECTOR PROBLEM 2
AMS (altered mental status)
AMS (altered mental status)
Hip fracture, right
AMS (altered mental status)

## 2021-06-23 NOTE — PROGRESS NOTE ADULT - PROBLEM SELECTOR PLAN 2
multifactorial  neuro eval if necessary
ortho eval  for hip surgery today
multifactorial  neuro eval if necessary

## 2021-06-23 NOTE — PROGRESS NOTE ADULT - REASON FOR ADMISSION
Acute Altered in mental status
Acute Altered in mental status/Hip fx
hip fracture
Acute Altered in mental status

## 2021-06-23 NOTE — PROGRESS NOTE ADULT - ATTENDING COMMENTS
I personally saw and examined the patient in detail.  I have spoken to the above provider regarding the assessment and plan of care.  I reviewed the above assessment and plan of care, and agree.  I have made changes in the body of the note where appropriate.
Seen/examined. agree with above.  tolerated or  confused/disoriented  no sign of acute ischemia/volume overload
chart reviewed    Patient seen and examined    Agree with plan as outlined above    89 yo M with PMH of HTN, HLD, dementia, A-fib, BPH, s/p aortic aneurysm repair with AVR, PPM ([employer]) presented to ED with worsening confusion and fall, found to have R femoral neck fracture. Cardiology consulted for cardiac clearance.     R femoral neck fracture  - S/P R Hip hemiarthroplasty, tolerated procedure well from CV standpoint  - PPM interrogation 06/16/2021 showed remaining longevity 1.5 years, multiple ATR, A paced 68%, V paved 41%  - Echo from 2016, normal LVEF  - No evidence of volume overload  - pain control, PT, hip precautions per ortho  - asa?    PAF  - BP: 112/52 (06-23-21 @ 05:05) (112/52 - 134/66)  - EKG showing NSR with RBBB with signs of intermittent V-pacing  - hrs slightly higher than before, and hb trending down.
I saw and examined the patient personally. Spoke with above provider regarding this case. I reviewed the above findings completely.  I agree with the above history, physical, and plan which I have edited where appropriate.     No signs of significant ischemia or volume overload. cont current care. Further cardiac workup will depend on clinical course.
I saw and examined the patient personally. Spoke with above provider regarding this case. I reviewed the above findings completely.  I agree with the above history, physical, and plan which I have edited where appropriate.     optimized for or. cont current care.

## 2021-06-23 NOTE — PROGRESS NOTE ADULT - ASSESSMENT
89 yo M with PMH of HTN, HLD, dementia, A-fib, BPH, s/p aortic aneurysm repair with AVR, PPM ([employer]) presented to ED with worsening confusion and fall, found to have R femoral neck fracture. Cardiology consulted for cardiac clearance.     R femoral neck fracture  - S/P R Hip hemiarthroplasty, tolerated procedure well from CV standpoint  - PPM interrogation 06/16/2021 showed remaining longevity 1.5 years, multiple ATR, A paced 68%, V paved 41%  - Echo from 2016, normal LVEF  - No evidence of volume overload  - pain control, PT, hip precautions per ortho  - asa?    PAF  - BP: 112/52 (06-23-21 @ 05:05) (112/52 - 134/66)  - EKG showing NSR with RBBB with signs of intermittent V-pacing  - hrs slightly higher than before, and hb trending down.    Hypertension  - BP has been labile but more stable now  - Not on antihypertensives (other than cardura) and would keep off  - Monitor routine hemodynamics     ?RAMIRO on CKD  - encourage po intake for his RAMIRO as well as  hypernatremia  - renal function improving as per renal may be his baseline  - monitor renal indices    Hyperlipidemia  - Continue Zocor    Anemia  - trend CBC  - Hematology following    - Monitor and replete lytes, keep K>4, Mg>2.  - All other medical needs as per primary team.  - Other cardiovascular workup will depend on clinical course.  - Will continue to follow.    Rafia Lane, MS ANP, AGACNP  Nurse Practitioner- Cardiology   Spectra #[phone]

## 2021-06-23 NOTE — PROGRESS NOTE ADULT - PROBLEM SELECTOR PLAN 5
serial CBC  PRBC PRN  heme eval with Dr. HEARN
serial CBC  PRBC PRN  heme eval with Dr. HEARN  improved post prbc transfusion
serial CBC  PRBC PRN  heme eval

## 2021-06-23 NOTE — PROGRESS NOTE ADULT - PROBLEM SELECTOR PROBLEM 1
RAMIRO (acute kidney injury)
AMS (altered mental status)
RAMIRO (acute kidney injury)

## 2021-06-23 NOTE — ADVANCED PRACTICE NURSE CONSULT - RECOMMEDATIONS
Patient assessed by RN found to have stage one pressure injury; RN educated in the care of a patient with stage one and two pressure injury and pressure injury prevention.

## 2021-06-23 NOTE — PROGRESS NOTE ADULT - PROBLEM SELECTOR PLAN 4
add flomax  bean per urology  uro eval with dr. virgen  will dc to [facility] with bean  tov when more ambulatory at rehab
add flomax  bean per urology
add flomax  bean per urology  uro eval with dr. virgen
add flomax  bean per urology
add flomax  bean per urology  uro eval with dr. virgen

## 2021-06-23 NOTE — PROGRESS NOTE ADULT - PROVIDER SPECIALTY LIST ADULT
Cardiology
Cardiology
Heme/Onc
Nephrology
Orthopedics
Cardiology
Nephrology
Orthopedics
Cardiology
Cardiology
Family Medicine
Nephrology
Orthopedics
Orthopedics
Anesthesia
Cardiology
Cardiology
Hospitalist
Nephrology
Orthopedics
Nephrology
Family Medicine
Internal Medicine
Family Medicine
Hospitalist

## 2021-06-23 NOTE — PROGRESS NOTE ADULT - SUBJECTIVE AND OBJECTIVE BOX
[facility] -- Jian Newman Grossman, Wachsman, Rodolfo Cota Savella, Goodger: Office # [phone]    Follow Up:  Pre and Postop Cardiac Optimization     Subjective/Observations: Patient seen and examined. Patient awake, alert, confused resting comfortably in bed. Unable to provide meaningful information. Tolerating room air.     REVIEW OF SYSTEMS: All review of systems is negative for eye, ENT, GI, , allergic, dermatologic, musculoskeletal and neurologic except as described above    PAST MEDICAL & SURGICAL HISTORY:  HTN (hypertension)    HLD (hyperlipidemia)    h/o Aortic aneurysm    h/o TIA (transient ischemic attack) 6 yrs ago    h/o Disc prolapse Lumbar    h/o Symptomatic Bradycardia    h/o  ? Atrial fibrillation    Hyperlipidemia    Benign essential hypertension    Aortic valve disorder    H/O unilateral nephrectomy    Aortic prosthetic valve regurgitation    Artificial pacemaker    Calculus of ureter  2/2015    h/o Lumbar laminectomy    Pacemaker  Oct 2011    s/p Aortic aneurysm repair  10/25/2011    Aortic valve replacement Oct 2011    Patent foramen ovale repair        MEDICATIONS  (STANDING):  ascorbic acid 500 milliGRAM(s) Oral daily  doxazosin 4 milliGRAM(s) Oral at bedtime  enoxaparin Injectable 40 milliGRAM(s) SubCutaneous every 24 hours  finasteride 5 milliGRAM(s) Oral daily  folic acid 1 milliGRAM(s) Oral daily  pantoprazole    Tablet 40 milliGRAM(s) Oral before breakfast  polyethylene glycol 3350 17 Gram(s) Oral at bedtime  senna 2 Tablet(s) Oral at bedtime  simvastatin 10 milliGRAM(s) Oral at bedtime    MEDICATIONS  (PRN):  acetaminophen   Tablet .. 650 milliGRAM(s) Oral every 6 hours PRN Mild Pain (1 - 3)  bisacodyl Suppository 10 milliGRAM(s) Rectal once PRN Constipation  magnesium hydroxide Suspension 30 milliLiter(s) Oral daily PRN Constipation  meclizine 12.5 milliGRAM(s) Oral three times a day PRN Dizziness  morphine  - Injectable 2 milliGRAM(s) IV Push every 4 hours PRN Severe Pain (7 - 10)  ondansetron Injectable 4 milliGRAM(s) IV Push every 6 hours PRN Nausea and/or Vomiting  traMADol 50 milliGRAM(s) Oral every 6 hours PRN Moderate Pain (4 - 6)    Allergies    shellfish (Rash)  Sulfa druga - Rash (Rash)  sulfa drugs (Hives)    Intolerances      Vital Signs Last 24 Hrs  T(C): 36.8 (23 Jun 2021 05:05), Max: 36.8 (22 Jun 2021 20:42)  T(F): 98.3 (23 Jun 2021 05:05), Max: 98.3 (23 Jun 2021 05:05)  HR: 82 (23 Jun 2021 05:05) (68 - 87)  BP: 112/52 (23 Jun 2021 05:05) (112/52 - 134/66)  BP(mean): --  RR: 18 (23 Jun 2021 05:05) (17 - 18)  SpO2: 93% (23 Jun 2021 05:05) (91% - 94%)  I&O's Summary    22 Jun 2021 07:01  -  23 Jun 2021 07:00  --------------------------------------------------------  IN: 306 mL / OUT: 250 mL / NET: 56 mL          TELE: Not on telemetry   PHYSICAL EXAM:  Appearance: NAD, no distress, alert, frail   HEENT: Moist Mucous Membranes, Anicteric  Cardiovascular: Regular rate and rhythm, Normal S1 S2, No JVD, No murmurs, No rubs, gallops or clicks  Respiratory: Non-labored, Clear to auscultation, No rales, No rhonchi, No wheezing.   Gastrointestinal:  Soft, Non-tender, + BS  Skin: Warm and dry, No visible rashes or ulcers, No ecchymosis, No cyanosis  Musculoskeletal: No clubbing, No cyanosis, No joint swelling/tenderness  Psychiatry: Mood & affect appropriate    LABS: All Labs Reviewed:                        9.8    6.02  )-----------( 250      ( 23 Jun 2021 07:47 )             29.5                         8.2    4.96  )-----------( 231      ( 22 Jun 2021 13:09 )             24.8                         8.1    x     )-----------( x        ( 21 Jun 2021 18:19 )             24.5     23 Jun 2021 07:47    145    |  111    |  45     ----------------------------<  128    3.7     |  26     |  1.60   22 Jun 2021 13:09    146    |  111    |  39     ----------------------------<  109    3.5     |  26     |  1.50   21 Jun 2021 14:31    143    |  110    |  39     ----------------------------<  102    3.6     |  28     |  1.50     Ca    8.6        23 Jun 2021 07:47  Ca    8.6        22 Jun 2021 13:09  Ca    8.3        21 Jun 2021 14:31  Phos  2.6       23 Jun 2021 07:47    TPro  5.9    /  Alb  2.3    /  TBili  0.7    /  DBili  x      /  AST  101    /  ALT  39     /  AlkPhos  141    21 Jun 2021 14:31      Creatine Kinase, Serum: 207 U/L (06-17-21 @ 06:03)    < from: 12 Lead ECG (01.05.15 @ 09:40) >  Ventricular Rate 65 BPM  Atrial Rate 65 BPM  P-R Interval 190 ms  QRS Duration 134 ms  Q-T Interval 436 ms  QTC Calculation(Bezet) 453 ms  P Axis 35 degrees  R Axis 2 degrees  T Axis 49 degrees  Diagnosis Line Normal sinus rhythm  Right bundle branch block  Confirmed by George Layton ([id number]) on 1/5/2015 3:11:05 PM  < end of copied text >      < from: Xray Hip 1 View Intraoperative, Right (06.17.21 @ 22:19) >  EXAM:  XR HIP 1V RT INTRAOP                        PROCEDURE DATE:  06/17/2021    INTERPRETATION:  HISTORY: Hemiarthroplasty  TECHNIQUE: Two views of the RIGHT hip are submitted.  Findings: Evaluation demonstrates both femoral and acetabular components well-seated and in good anatomic alignment.  There is no fracture.  The visualized pelvis is within normal limits.  Impression:  Prosthetic Hip replacement in proper anatomical alignment..  BRONWYN BERRY MD;Attending Radiologist  This document has been electronically signed. Jun 18 2021  2:02PM  < end of copied text >

## 2022-05-04 NOTE — PHYSICAL THERAPY INITIAL EVALUATION ADULT - BALANCE DISTURBANCE, IDENTIFIED IMPAIRMENT CONTRIBUTE, REHAB EVAL
decreased ROM/decreased strength
1. Laparoscopic  sleeve gastrectomy recommendations reviewed/reinforced (discharge instruction handout references). Bariatric full liquid diet reviewed- sugar free, caffeine free, no carbonated beverages, low fat, no straws, no chewing gum, 6 small meals/day gradually increasing volume, and sipping beverages slowly, no water during meals- drink water 1 hour before or after meals, meeting hydration and protein needs. Discussed vitamin/mineral supplement compliance as discussed above. 2. RD to remain available to reinforce nutrition education as requested by patient/family/caregiver./verbal instruction/written material/patient instructed

## 2023-02-09 NOTE — H&P ADULT - NS ABD PE RECTAL EXAM
----- [facility] sent at 2/9/2023  4:30 PM EST -----  Please let patient know that he did not have any significant blood in his urine. patient refused